# Patient Record
Sex: MALE | Race: WHITE | Employment: FULL TIME | URBAN - METROPOLITAN AREA
[De-identification: names, ages, dates, MRNs, and addresses within clinical notes are randomized per-mention and may not be internally consistent; named-entity substitution may affect disease eponyms.]

---

## 2018-12-18 ENCOUNTER — APPOINTMENT (OUTPATIENT)
Dept: GENERAL RADIOLOGY | Age: 48
End: 2018-12-18
Payer: COMMERCIAL

## 2018-12-18 ENCOUNTER — HOSPITAL ENCOUNTER (EMERGENCY)
Age: 48
Discharge: HOME | End: 2018-12-18
Attending: EMERGENCY MEDICINE
Payer: COMMERCIAL

## 2018-12-18 VITALS
OXYGEN SATURATION: 98 % | WEIGHT: 190 LBS | HEART RATE: 78 BPM | SYSTOLIC BLOOD PRESSURE: 167 MMHG | TEMPERATURE: 98 F | DIASTOLIC BLOOD PRESSURE: 89 MMHG | RESPIRATION RATE: 18 BRPM | BODY MASS INDEX: 30.67 KG/M2

## 2018-12-18 DIAGNOSIS — M25.571 ACUTE RIGHT ANKLE PAIN: Primary | ICD-10-CM

## 2018-12-18 PROCEDURE — 73610 X-RAY EXAM OF ANKLE: CPT

## 2018-12-18 PROCEDURE — 99283 EMERGENCY DEPT VISIT LOW MDM: CPT

## 2018-12-18 ASSESSMENT — PAIN DESCRIPTION - LOCATION: LOCATION: ANKLE

## 2018-12-18 ASSESSMENT — PAIN SCALES - GENERAL: PAINLEVEL_OUTOF10: 7

## 2018-12-18 NOTE — ED PROVIDER NOTES
to auscultation bilaterally  Cardiovascular: Regular rate and rhythm, 2+ radial pulses bilaterally  Abdominal: Nondistended abdomen, soft, nontender without rebound or gaurding  Skin: Warm, dry well perfused, no rashes  Extremities: no obvious deformities, no tenderness to palpation diffusely, ankle with mild tenderness palpation of the tibia till talar joint but no swelling or effusion. He is nervous inside the tube was DP pulse and intact sensation. His and painful dorsiflexion but full dorsiflexion and plantar flexion of the ankle. No lateral malleolar medial malleolar tenderness no knee pain or proximal fibular tenderness. Neurologic:  Alert and oriented, speech is clear and intact without dysarthria, gait is intact  Psychologic: appropriate mood and affect  Diagnostic Results     EKG       RADIOLOGY:  XR ANKLE RIGHT (MIN 3 VIEWS)   Final Result      Degenerative change      No acute bony abnormality of the right ankle      If concern for acute injury splinting with follow-up recommended          LABS:   No results found for this visit on 12/18/18. ED BEDSIDE ULTRASOUND:      RECENT VITALS:  BP: (!) 167/89,Temp: 98 °F (36.7 °C), Pulse: 78, Resp: 18, SpO2: 98 %     Procedures       ED Course     Nursing Notes, Past Medical Hx, Past Surgical Hx, Social Hx,Allergies, and Family Hx were reviewed. The patient was given the following medications:  No orders of the defined types were placed in this encounter. CONSULTS:  None    MEDICAL DECISIONMAKING / ASSESSMENT / PLAN     Alfred Bailey is a 46 y.o. male resents today with right anterior ankle pain. He endorses negative plantarflexion where he seems it jammed the talotibial joint locking on the steps. He has been able to put weight on it. Here x-rays are within normal limits.   He is denying wanting to use crutches began weightbearing as tolerated this time but we did endorse that if he continues to have pain should follow-up with an orthopedic

## 2021-05-27 ENCOUNTER — HOSPITAL ENCOUNTER (EMERGENCY)
Age: 51
Discharge: HOME | End: 2021-05-27
Attending: EMERGENCY MEDICINE
Payer: COMMERCIAL

## 2021-05-27 ENCOUNTER — APPOINTMENT (OUTPATIENT)
Dept: GENERAL RADIOLOGY | Age: 51
End: 2021-05-27
Payer: COMMERCIAL

## 2021-05-27 VITALS
SYSTOLIC BLOOD PRESSURE: 126 MMHG | WEIGHT: 220.68 LBS | TEMPERATURE: 98 F | OXYGEN SATURATION: 99 % | HEIGHT: 66 IN | RESPIRATION RATE: 10 BRPM | DIASTOLIC BLOOD PRESSURE: 89 MMHG | HEART RATE: 67 BPM | BODY MASS INDEX: 35.47 KG/M2

## 2021-05-27 DIAGNOSIS — R42 LIGHTHEADEDNESS: Primary | ICD-10-CM

## 2021-05-27 LAB
ANION GAP SERPL CALCULATED.3IONS-SCNC: 10 MMOL/L (ref 3–16)
BASOPHILS ABSOLUTE: 0.1 K/UL (ref 0–0.2)
BASOPHILS RELATIVE PERCENT: 0.7 %
BUN BLDV-MCNC: 16 MG/DL (ref 7–20)
CALCIUM SERPL-MCNC: 10 MG/DL (ref 8.3–10.6)
CHLORIDE BLD-SCNC: 101 MMOL/L (ref 99–110)
CO2: 26 MMOL/L (ref 21–32)
CREAT SERPL-MCNC: 1.2 MG/DL (ref 0.9–1.3)
EOSINOPHILS ABSOLUTE: 0.1 K/UL (ref 0–0.6)
EOSINOPHILS RELATIVE PERCENT: 1.3 %
GFR AFRICAN AMERICAN: >60
GFR NON-AFRICAN AMERICAN: >60
GLUCOSE BLD-MCNC: 111 MG/DL (ref 70–99)
HCT VFR BLD CALC: 46.9 % (ref 40.5–52.5)
HEMOGLOBIN: 16.2 G/DL (ref 13.5–17.5)
INR BLD: 3.68 (ref 0.86–1.14)
LYMPHOCYTES ABSOLUTE: 2.9 K/UL (ref 1–5.1)
LYMPHOCYTES RELATIVE PERCENT: 40.5 %
MCH RBC QN AUTO: 30 PG (ref 26–34)
MCHC RBC AUTO-ENTMCNC: 34.6 G/DL (ref 31–36)
MCV RBC AUTO: 86.7 FL (ref 80–100)
MONOCYTES ABSOLUTE: 0.9 K/UL (ref 0–1.3)
MONOCYTES RELATIVE PERCENT: 12.2 %
NEUTROPHILS ABSOLUTE: 3.2 K/UL (ref 1.7–7.7)
NEUTROPHILS RELATIVE PERCENT: 45.3 %
PDW BLD-RTO: 13.7 % (ref 12.4–15.4)
PLATELET # BLD: 204 K/UL (ref 135–450)
PMV BLD AUTO: 8.8 FL (ref 5–10.5)
POTASSIUM REFLEX MAGNESIUM: 3.6 MMOL/L (ref 3.5–5.1)
PRO-BNP: 40 PG/ML (ref 0–124)
PROTHROMBIN TIME: 43.3 SEC (ref 10–13.2)
RBC # BLD: 5.4 M/UL (ref 4.2–5.9)
SODIUM BLD-SCNC: 137 MMOL/L (ref 136–145)
TROPONIN: <0.01 NG/ML
TROPONIN: <0.01 NG/ML
WBC # BLD: 7.2 K/UL (ref 4–11)

## 2021-05-27 PROCEDURE — 85610 PROTHROMBIN TIME: CPT

## 2021-05-27 PROCEDURE — 83880 ASSAY OF NATRIURETIC PEPTIDE: CPT

## 2021-05-27 PROCEDURE — 93005 ELECTROCARDIOGRAM TRACING: CPT | Performed by: EMERGENCY MEDICINE

## 2021-05-27 PROCEDURE — 80048 BASIC METABOLIC PNL TOTAL CA: CPT

## 2021-05-27 PROCEDURE — 85025 COMPLETE CBC W/AUTO DIFF WBC: CPT

## 2021-05-27 PROCEDURE — 84484 ASSAY OF TROPONIN QUANT: CPT

## 2021-05-27 PROCEDURE — 71046 X-RAY EXAM CHEST 2 VIEWS: CPT

## 2021-05-27 PROCEDURE — 99285 EMERGENCY DEPT VISIT HI MDM: CPT

## 2021-05-27 ASSESSMENT — PAIN SCALES - GENERAL: PAINLEVEL_OUTOF10: 0

## 2021-05-27 ASSESSMENT — ENCOUNTER SYMPTOMS
COUGH: 0
RESPIRATORY NEGATIVE: 1
SHORTNESS OF BREATH: 0

## 2021-05-27 NOTE — ED PROVIDER NOTES
[address] [facility] [address] ENCOUNTER          PHYSICIAN ASSISTANT NOTE       Date of evaluation: 5/27/2021    Chief Complaint     Dizziness (Pt was at work this morning sitting at his desk and then felt onset of lightheaded, dizziness, SOB. )      History of Present Illness     Mercedez Jean is a 44 y.o. male who presents for dizziness. Patient reports he is seated at work when he suddenly felt lightheaded like he was going to pass out. No vertigo. Patient reports the symptoms have been waxing and waning since onset for seconds at a time. Patient denies any chest pain. Patient reports he may be felt a little short of breath, but reports it is possibly because he was worried about what was going on. Patient reports he normally only has coffee in the morning and that is what he had today, no change. Patient denies any recent illness. No leg pain or swelling. Patient takes warfarin due to history of aortic valve replacement in 2017. Patient has been compliant with his medication and has his levels checked regularly. Patient reports he has not had an echocardiogram \"in a while. \"    Review of Systems     Review of Systems   Constitutional: Negative. Negative for fever. Respiratory: Negative. Negative for cough and shortness of breath. Cardiovascular: Negative. Negative for chest pain and leg swelling. Musculoskeletal: Negative. Skin: Negative. Neurological: Positive for light-headedness. Negative for syncope, facial asymmetry, speech difficulty, weakness and headaches. Psychiatric/Behavioral: Negative. All other systems reviewed and are negative. Past Medical, Surgical, Family, and Social History     He has a past medical history of Hypertension, Murmur, and Other activity(E029.9). He has a past surgical history that includes Aortic valve replacement and Cholecystectomy. His family history is not on file. He reports that he has never smoked.  He does not have any smokeless tobacco history on file. He reports current alcohol use. He reports that he does not use drugs. Medications     Discharge Medication List as of 5/27/2021  2:40 PM      CONTINUE these medications which have NOT CHANGED    Details   metoprolol (LOPRESSOR) 25 MG tablet Take 25 mg by mouth 2 times daily      IBUPROFEN Take 600 mg by mouth as needed. Allergies     He has No Known Allergies. Physical Exam     INITIAL VITALS: BP: (!) 140/101, Temp: 98 °F (36.7 °C), Pulse: 70, Resp: 18, SpO2: 100 %  Physical Exam  Vitals and nursing note reviewed. Constitutional:       Appearance: He is not toxic-appearing. HENT:      Head: Normocephalic and atraumatic. Eyes:      Extraocular Movements: Extraocular movements intact. Conjunctiva/sclera: Conjunctivae normal.      Pupils: Pupils are equal, round, and reactive to light. Cardiovascular:      Rate and Rhythm: Normal rate and regular rhythm. Pulmonary:      Effort: Pulmonary effort is normal.      Breath sounds: Normal breath sounds. Musculoskeletal:      Cervical back: Normal range of motion and neck supple. Skin:     General: Skin is warm and dry. Neurological:      General: No focal deficit present. Mental Status: He is alert and oriented to person, place, and time. Mental status is at baseline. Psychiatric:         Mood and Affect: Mood normal.         Behavior: Behavior normal.         Thought Content: Thought content normal.         Judgment: Judgment normal.         Diagnostic Results     EKG   Interpreted in conjunction with emergency department physician No att. providers found  Rhythm: normal sinus   Rate: normal  Axis: normal  Ectopy: none  Conduction: normal  ST Segments: no acute change  T Waves: non specific changes  Q Waves:nonspecific  Clinical Impression: non-specific EKG      RADIOLOGY:  XR CHEST (2 VW)   Final Result   1. No evidence of acute cardiopulmonary disease.            LABS:   Results for orders placed or performed during the hospital encounter of 05/27/21   CBC Auto Differential   Result Value Ref Range    WBC 7.2 4.0 - 11.0 K/uL    RBC 5.40 4.20 - 5.90 M/uL    Hemoglobin 16.2 13.5 - 17.5 g/dL    Hematocrit 46.9 40.5 - 52.5 %    MCV 86.7 80.0 - 100.0 fL    MCH 30.0 26.0 - 34.0 pg    MCHC 34.6 31.0 - 36.0 g/dL    RDW 13.7 12.4 - 15.4 %    Platelets 343 749 - 277 K/uL    MPV 8.8 5.0 - 10.5 fL    Neutrophils % 45.3 %    Lymphocytes % 40.5 %    Monocytes % 12.2 %    Eosinophils % 1.3 %    Basophils % 0.7 %    Neutrophils Absolute 3.2 1.7 - 7.7 K/uL    Lymphocytes Absolute 2.9 1.0 - 5.1 K/uL    Monocytes Absolute 0.9 0.0 - 1.3 K/uL    Eosinophils Absolute 0.1 0.0 - 0.6 K/uL    Basophils Absolute 0.1 0.0 - 0.2 K/uL   Basic Metabolic Panel w/ Reflex to MG   Result Value Ref Range    Sodium 137 136 - 145 mmol/L    Potassium reflex Magnesium 3.6 3.5 - 5.1 mmol/L    Chloride 101 99 - 110 mmol/L    CO2 26 21 - 32 mmol/L    Anion Gap 10 3 - 16    Glucose 111 (H) 70 - 99 mg/dL    BUN 16 7 - 20 mg/dL    CREATININE 1.2 0.9 - 1.3 mg/dL    GFR Non-African American >60 >60    GFR African American >60 >60    Calcium 10.0 8.3 - 10.6 mg/dL   Brain Natriuretic Peptide   Result Value Ref Range    Pro-BNP 40 0 - 124 pg/mL   Troponin   Result Value Ref Range    Troponin <0.01 <0.01 ng/mL   Protime-INR   Result Value Ref Range    Protime 43.3 (H) 10.0 - 13.2 sec    INR 3.68 (H) 0.86 - 1.14   Troponin   Result Value Ref Range    Troponin <0.01 <0.01 ng/mL       ED BEDSIDE ULTRASOUND:      RECENT VITALS:  BP: 126/89, Temp: 98 °F (36.7 °C), Pulse: 67, Resp: 10, SpO2: 99 %     Procedures         ED Course     Nursing Notes, Past Medical Hx,Past Surgical Hx, Social Hx, Allergies, and Family Hx were reviewed. The patient was given the following medications:  No orders of the defined types were placed in this encounter.       CONSULTS:  None    MEDICAL DECISION MAKING / ASSESSMENT / PLAN      Andree is a 44 y.o. male with lightheadedness. Patient is well appearing and in no acute distress. EKG is normal sinus rhythm without signs of ischemia. Chest x-ray clear. Normal blood counts, electrolytes, renal function. proBNP is 40. Patient with 2 negative troponins. INR is 3.68. Patient has been hemodynamically stable throughout his ER stay. Patient does have occasional singular PVCs with no associated symptoms. Patient has not signs of ACS, CHF, PE, dissection on today's work-up. No signs of stroke. No vertiginous symptoms. Patient has not had anything besides coffee today, encouraged to hydrate. No signs of electrolyte imbalance or MAC. Patient will be discharged to follow-up with his cardiologist. Return precautions discussed. This patient was also evaluated by the attending physician. All care plans were discussed and agreed upon. Clinical Impression     1.  Lightheadedness        Disposition     PATIENT REFERRED TO:  [facility]  [address]  [place] [address]  [phone]    Schedule an appointment as soon as possible for a visit       [facility] Emergency Department  [address] [place] [address]  [phone]    As needed, If symptoms worsen      DISCHARGE MEDICATIONS:  Discharge Medication List as of 5/27/2021  2:40 PM          DISPOSITION Decision To Discharge 05/27/2021 02:00:49 PM        Simi Olivares PA-C  05/27/21 5103

## 2021-05-27 NOTE — ED PROVIDER NOTES
ED Attending Attestation Note     Date of evaluation: 5/27/2021    This patient was seen by the advance practice provider. I have seen and examined the patient, agree with the workup, evaluation, management and diagnosis. The care plan has been discussed. I have reviewed the ECG and concur with the JOSE's interpretation. My assessment reveals adult male generally well in appearance he presents with some episodic lightheadedness today, reporting it is not a feeling of vertigo or room spinning or really unsteadiness. He was observed on the monitor without significant ectopy. His vital signs are stable. His lab work-up is unremarkable. His EKG shows sinus rhythm without arrhythmia. He has a normal neurological exam including normal gait.        Jason Varela MD  05/27/21 6277

## 2021-05-27 NOTE — ED NOTES
Pt placed on CMU, pulse ox, and NIBP. Pt given call light and instructed on its use. No new needs at this time. Will cont to monitor.       Misa Mckenzie RN  05/27/21 5791

## 2021-05-27 NOTE — ED NOTES
EKG done on arrival and given to [facility] JOSY     [address]  05/27/21 0297

## 2021-06-03 LAB
EKG ATRIAL RATE: 72 BPM
EKG DIAGNOSIS: NORMAL
EKG P AXIS: 25 DEGREES
EKG P-R INTERVAL: 138 MS
EKG Q-T INTERVAL: 380 MS
EKG QRS DURATION: 96 MS
EKG QTC CALCULATION (BAZETT): 416 MS
EKG R AXIS: 40 DEGREES
EKG T AXIS: 60 DEGREES
EKG VENTRICULAR RATE: 72 BPM

## 2023-01-07 ENCOUNTER — HOSPITAL ENCOUNTER (INPATIENT)
Age: 53
LOS: 2 days | Discharge: HOME | DRG: 310 | End: 2023-01-09
Attending: EMERGENCY MEDICINE | Admitting: INTERNAL MEDICINE
Payer: COMMERCIAL

## 2023-01-07 ENCOUNTER — APPOINTMENT (OUTPATIENT)
Dept: GENERAL RADIOLOGY | Age: 53
DRG: 310 | End: 2023-01-07
Payer: COMMERCIAL

## 2023-01-07 DIAGNOSIS — I48.91 ATRIAL FIBRILLATION WITH RVR (HCC): Primary | ICD-10-CM

## 2023-01-07 DIAGNOSIS — R79.1 SUBTHERAPEUTIC INTERNATIONAL NORMALIZED RATIO (INR): ICD-10-CM

## 2023-01-07 LAB
A/G RATIO: 1.6 (ref 1.1–2.2)
ALBUMIN SERPL-MCNC: 4.6 G/DL (ref 3.4–5)
ALP BLD-CCNC: 52 U/L (ref 40–129)
ALT SERPL-CCNC: 28 U/L (ref 10–40)
ANION GAP SERPL CALCULATED.3IONS-SCNC: 11 MMOL/L (ref 3–16)
AST SERPL-CCNC: 24 U/L (ref 15–37)
BASOPHILS ABSOLUTE: 0.1 K/UL (ref 0–0.2)
BASOPHILS RELATIVE PERCENT: 0.8 %
BILIRUB SERPL-MCNC: 1.1 MG/DL (ref 0–1)
BUN BLDV-MCNC: 15 MG/DL (ref 7–20)
CALCIUM SERPL-MCNC: 10.4 MG/DL (ref 8.3–10.6)
CHLORIDE BLD-SCNC: 102 MMOL/L (ref 99–110)
CO2: 25 MMOL/L (ref 21–32)
CREAT SERPL-MCNC: 1 MG/DL (ref 0.9–1.3)
EOSINOPHILS ABSOLUTE: 0.1 K/UL (ref 0–0.6)
EOSINOPHILS RELATIVE PERCENT: 1.9 %
GFR SERPL CREATININE-BSD FRML MDRD: >60 ML/MIN/{1.73_M2}
GLUCOSE BLD-MCNC: 110 MG/DL (ref 70–99)
HCT VFR BLD CALC: 48.5 % (ref 40.5–52.5)
HEMOGLOBIN: 16.3 G/DL (ref 13.5–17.5)
INR BLD: 1.88 (ref 0.87–1.14)
LYMPHOCYTES ABSOLUTE: 2.3 K/UL (ref 1–5.1)
LYMPHOCYTES RELATIVE PERCENT: 31.4 %
MAGNESIUM: 2 MG/DL (ref 1.8–2.4)
MCH RBC QN AUTO: 29.3 PG (ref 26–34)
MCHC RBC AUTO-ENTMCNC: 33.6 G/DL (ref 31–36)
MCV RBC AUTO: 87.3 FL (ref 80–100)
MONOCYTES ABSOLUTE: 0.9 K/UL (ref 0–1.3)
MONOCYTES RELATIVE PERCENT: 12.3 %
NEUTROPHILS ABSOLUTE: 4 K/UL (ref 1.7–7.7)
NEUTROPHILS RELATIVE PERCENT: 53.6 %
PDW BLD-RTO: 14.7 % (ref 12.4–15.4)
PLATELET # BLD: 196 K/UL (ref 135–450)
PMV BLD AUTO: 8.7 FL (ref 5–10.5)
POTASSIUM REFLEX MAGNESIUM: 4.6 MMOL/L (ref 3.5–5.1)
PROTHROMBIN TIME: 21.6 SEC (ref 11.7–14.5)
RBC # BLD: 5.55 M/UL (ref 4.2–5.9)
SODIUM BLD-SCNC: 138 MMOL/L (ref 136–145)
TOTAL PROTEIN: 7.4 G/DL (ref 6.4–8.2)
TROPONIN: <0.01 NG/ML
TSH REFLEX: 2.99 UIU/ML (ref 0.27–4.2)
WBC # BLD: 7.5 K/UL (ref 4–11)

## 2023-01-07 PROCEDURE — 83735 ASSAY OF MAGNESIUM: CPT

## 2023-01-07 PROCEDURE — 84484 ASSAY OF TROPONIN QUANT: CPT

## 2023-01-07 PROCEDURE — 71045 X-RAY EXAM CHEST 1 VIEW: CPT

## 2023-01-07 PROCEDURE — 84443 ASSAY THYROID STIM HORMONE: CPT

## 2023-01-07 PROCEDURE — 85610 PROTHROMBIN TIME: CPT

## 2023-01-07 PROCEDURE — 96365 THER/PROPH/DIAG IV INF INIT: CPT

## 2023-01-07 PROCEDURE — 2060000000 HC ICU INTERMEDIATE R&B

## 2023-01-07 PROCEDURE — 2500000003 HC RX 250 WO HCPCS: Performed by: INTERNAL MEDICINE

## 2023-01-07 PROCEDURE — 80053 COMPREHEN METABOLIC PANEL: CPT

## 2023-01-07 PROCEDURE — 36415 COLL VENOUS BLD VENIPUNCTURE: CPT

## 2023-01-07 PROCEDURE — 1200000000 HC SEMI PRIVATE

## 2023-01-07 PROCEDURE — 2500000003 HC RX 250 WO HCPCS: Performed by: PHYSICIAN ASSISTANT

## 2023-01-07 PROCEDURE — 6370000000 HC RX 637 (ALT 250 FOR IP): Performed by: INTERNAL MEDICINE

## 2023-01-07 PROCEDURE — 2580000003 HC RX 258: Performed by: PHYSICIAN ASSISTANT

## 2023-01-07 PROCEDURE — 85025 COMPLETE CBC W/AUTO DIFF WBC: CPT

## 2023-01-07 PROCEDURE — 2580000003 HC RX 258: Performed by: INTERNAL MEDICINE

## 2023-01-07 PROCEDURE — 93005 ELECTROCARDIOGRAM TRACING: CPT | Performed by: EMERGENCY MEDICINE

## 2023-01-07 PROCEDURE — 6360000002 HC RX W HCPCS: Performed by: NURSE PRACTITIONER

## 2023-01-07 PROCEDURE — 99285 EMERGENCY DEPT VISIT HI MDM: CPT

## 2023-01-07 RX ORDER — SODIUM CHLORIDE 9 MG/ML
INJECTION, SOLUTION INTRAVENOUS PRN
Status: DISCONTINUED | OUTPATIENT
Start: 2023-01-07 | End: 2023-01-09 | Stop reason: HOSPADM

## 2023-01-07 RX ORDER — DILTIAZEM HYDROCHLORIDE 5 MG/ML
15 INJECTION INTRAVENOUS ONCE
Status: COMPLETED | OUTPATIENT
Start: 2023-01-07 | End: 2023-01-07

## 2023-01-07 RX ORDER — ACETAMINOPHEN 325 MG/1
650 TABLET ORAL EVERY 6 HOURS PRN
Status: DISCONTINUED | OUTPATIENT
Start: 2023-01-07 | End: 2023-01-09 | Stop reason: HOSPADM

## 2023-01-07 RX ORDER — SODIUM CHLORIDE 0.9 % (FLUSH) 0.9 %
5-40 SYRINGE (ML) INJECTION EVERY 12 HOURS SCHEDULED
Status: DISCONTINUED | OUTPATIENT
Start: 2023-01-07 | End: 2023-01-09 | Stop reason: HOSPADM

## 2023-01-07 RX ORDER — POLYETHYLENE GLYCOL 3350 17 G/17G
17 POWDER, FOR SOLUTION ORAL DAILY PRN
Status: DISCONTINUED | OUTPATIENT
Start: 2023-01-07 | End: 2023-01-09 | Stop reason: HOSPADM

## 2023-01-07 RX ORDER — PROCHLORPERAZINE EDISYLATE 5 MG/ML
10 INJECTION INTRAMUSCULAR; INTRAVENOUS EVERY 6 HOURS PRN
Status: DISCONTINUED | OUTPATIENT
Start: 2023-01-07 | End: 2023-01-09 | Stop reason: HOSPADM

## 2023-01-07 RX ORDER — ATORVASTATIN CALCIUM 10 MG/1
10 TABLET, FILM COATED ORAL NIGHTLY
Status: DISCONTINUED | OUTPATIENT
Start: 2023-01-07 | End: 2023-01-09 | Stop reason: HOSPADM

## 2023-01-07 RX ORDER — ACETAMINOPHEN 650 MG/1
650 SUPPOSITORY RECTAL EVERY 6 HOURS PRN
Status: DISCONTINUED | OUTPATIENT
Start: 2023-01-07 | End: 2023-01-09 | Stop reason: HOSPADM

## 2023-01-07 RX ORDER — WARFARIN SODIUM 3 MG/1
6 TABLET ORAL DAILY
COMMUNITY

## 2023-01-07 RX ORDER — ATORVASTATIN CALCIUM 10 MG/1
10 TABLET, FILM COATED ORAL DAILY
COMMUNITY

## 2023-01-07 RX ORDER — SODIUM CHLORIDE 0.9 % (FLUSH) 0.9 %
5-40 SYRINGE (ML) INJECTION PRN
Status: DISCONTINUED | OUTPATIENT
Start: 2023-01-07 | End: 2023-01-09 | Stop reason: HOSPADM

## 2023-01-07 RX ORDER — DIGOXIN 0.25 MG/ML
250 INJECTION INTRAMUSCULAR; INTRAVENOUS ONCE
Status: COMPLETED | OUTPATIENT
Start: 2023-01-07 | End: 2023-01-07

## 2023-01-07 RX ADMIN — ACETAMINOPHEN 650 MG: 325 TABLET ORAL at 20:21

## 2023-01-07 RX ADMIN — WARFARIN SODIUM 8 MG: 5 TABLET ORAL at 19:41

## 2023-01-07 RX ADMIN — Medication 10 ML: at 19:42

## 2023-01-07 RX ADMIN — ATORVASTATIN CALCIUM 10 MG: 10 TABLET, FILM COATED ORAL at 19:42

## 2023-01-07 RX ADMIN — DILTIAZEM HYDROCHLORIDE 15 MG: 5 INJECTION INTRAVENOUS at 11:40

## 2023-01-07 RX ADMIN — DILTIAZEM HYDROCHLORIDE 5 MG/HR: 5 INJECTION, SOLUTION INTRAVENOUS at 11:45

## 2023-01-07 RX ADMIN — DILTIAZEM HYDROCHLORIDE 15 MG/HR: 5 INJECTION, SOLUTION INTRAVENOUS at 20:19

## 2023-01-07 RX ADMIN — DIGOXIN 250 MCG: 0.25 INJECTION INTRAMUSCULAR; INTRAVENOUS at 21:17

## 2023-01-07 ASSESSMENT — PAIN SCALES - GENERAL
PAINLEVEL_OUTOF10: 0
PAINLEVEL_OUTOF10: 3

## 2023-01-07 ASSESSMENT — PAIN - FUNCTIONAL ASSESSMENT: PAIN_FUNCTIONAL_ASSESSMENT: NONE - DENIES PAIN

## 2023-01-07 ASSESSMENT — PAIN DESCRIPTION - DESCRIPTORS: DESCRIPTORS: ACHING

## 2023-01-07 ASSESSMENT — PAIN DESCRIPTION - LOCATION: LOCATION: HEAD

## 2023-01-07 NOTE — ED NOTES
C4 stated pt has not been assigned to a nurse yet, will call back.       Damir Ayoub RN  01/07/23 6539
[email or web address]
Pt provided with ice chips, states no other needs at this time.      Zahida Hampton RN  01/07/23 4634    
Pt provided with sandwich and drink. Pt states no other needs at this time.       Juan Hawkins, PHILIP  01/07/23 2227
Pt provided with urinal and warm blanket, pt states no other needs at this time.       Matt Can RN  01/07/23 9454
06-Sep-2022 16:56

## 2023-01-07 NOTE — H&P
Hospital Medicine History & Physical      PCP: Charito Paz    Date of Admission: 1/7/2023    Date of Service: Pt seen/examined on 01/07/23  and Admitted to Inpatient with expected LOS greater than two midnights due to medical therapy of atrial fibrillation with RVR    Chief Complaint:  Palpitations      History Of Present Illness:       45 y.o. male who presented to [facility] with episode of palpitations. Active patient who was working out. While he was using the elliptical he felt some palpitations. Felt his pulse which was very fast and irregular. Decided to come to the emergency department as he had a similar episode in the past and was diagnosed with atrial fibrillation. No chest pain, no shortness of breath. States he only had 1 episode like this about 6 years ago. At that time he developed acute atrial fibrillation with rapid ventricular response. Was diagnosed with bike use. Aortic valve with accompanying aortic stenosis. Had valve replacement. During that episode, atrial fibrillation resolved and has not recurred over the past 6 years. This is the second episode ever. No other accompanying symptoms  Nothing else that makes the patient feel better or worse. Past Medical History:          Diagnosis Date    Atrial fibrillation ([address]     Hypertension     Murmur     Other activity(E029.9)     nose bleeds for last couple years, increase frequency over last few months random        Past Surgical History:          Procedure Laterality Date    AORTIC VALVE REPLACEMENT      CHOLECYSTECTOMY         Medications Prior to Admission:      Prior to Admission medications    Medication Sig Start Date End Date Taking? Authorizing Provider   warfarin (COUMADIN) 3 MG tablet Take 6 mg by mouth daily   Yes Historical Provider, MD   atorvastatin (LIPITOR) 10 MG tablet Take 10 mg by mouth daily   Yes Historical Provider, MD   IBUPROFEN Take 600 mg by mouth as needed.       Historical Provider, MD Allergies:  Patient has no known allergies. Social History:      The patient currently lives at home    TOBACCO:   reports that he has never smoked. He does not have any smokeless tobacco history on file. ETOH:   reports current alcohol use. E-cigarette/Vaping       Questions Responses    E-cigarette/Vaping Use     Start Date     Passive Exposure     Quit Date     Counseling Given     Comments               Family History:      Reviewed and negative in regards to presenting illness/complaint. History reviewed. No pertinent family history. REVIEW OF SYSTEMS COMPLETED:   Pertinent positives as noted in the HPI. Palpitations. All other systems reviewed and negative. PHYSICAL EXAM PERFORMED:    /84   Pulse (!) 106   Temp 98.6 °F (37 °C) (Oral)   Resp 14   Ht 5' 6\" (1.676 m)   Wt 191 lb (86.6 kg)   SpO2 99%   BMI 30.83 kg/m²     General appearance:  No apparent distress, appears stated age and cooperative. HEENT:  Normal cephalic, atraumatic without obvious deformity. Pupils equal, round, and reactive to light. Extra ocular muscles intact. Conjunctivae/corneas clear. Neck: Supple, with full range of motion. No jugular venous distention. Trachea midline. Respiratory:  Normal respiratory effort. Clear to auscultation, bilaterally without Rales/Wheezes/Rhonchi. Cardiovascular:  Irregularly irregular rhythm with normal S1/S2 without murmurs, rubs or gallops. Abdomen: Soft, non-tender, non-distended with normal bowel sounds. Musculoskeletal:  No clubbing, cyanosis or edema bilaterally. Full range of motion without deformity. Skin: Skin color, texture, turgor normal.  No rashes or lesions. Neurologic:  Neurovascularly intact without any focal sensory/motor deficits.  Cranial nerves: II-XII intact, grossly non-focal.  Psychiatric:  Alert and oriented, thought content appropriate, normal insight  Capillary Refill: Brisk,3 seconds, normal  Peripheral Pulses: +2 palpable, equal bilaterally       Labs:     Recent Labs     01/07/23  1131   WBC 7.5   HGB 16.3   HCT 48.5        Recent Labs     01/07/23  1131      K 4.6      CO2 25   BUN 15   CREATININE 1.0   CALCIUM 10.4     Recent Labs     01/07/23  1131   AST 24   ALT 28   BILITOT 1.1*   ALKPHOS 52     Recent Labs     01/07/23  1131   INR 1.88*     Recent Labs     01/07/23  1131   TROPONINI <0.01       Urinalysis:    No results found for: [place], BACTERIA, RBCUA, BLOODU, [address], [address]    Radiology:     CXR: I have reviewed the CXR with the following interpretation: No acute changes  EKG:  I have reviewed the EKG with the following interpretation: Atrial fibrillation with RVR    XR CHEST PORTABLE   Final Result   No acute process. Consults:    IP CONSULT TO HOSPITALIST  PHARMACY TO DOSE WARFARIN  IP CONSULT TO CARDIOLOGY    ASSESSMENT:    Active Hospital Problems    Diagnosis Date Noted    Atrial fibrillation with rapid ventricular response ([facility] [address] [I48.91] 01/07/2023     Priority: Medium         PLAN:    Atrial fibrillation with rapid ventricular response  Second episode. First episode was 6 years ago when aortic valve stenosis was diagnosed. Started on diltiazem drip  Already on warfarin for anticoagulation. This will be continued. Patient does not have chest pain shortness of breath or any other signs of compromise in cardiac function. Patient's own cardiologist is at [facility].  We will consult a cardiologist at our hospital.  Given acute and unexpected onset, I will also check TSH to establish thyroid function. Mechanical cardiac valve in situ  Well-functioning. On warfarin. We will obtain an echo for better evaluation of location and function    Dyslipidemia  Continue statin at same dose and recheck lipid panel. No evidence of rhabdomyolysis    Discussed with the patient.   Questions answered      DVT Prophylaxis: Therapeutic anticoagulation  Diet: Regular diet  Code Status: Full code    PT/OT Eval Status: Not needed    Dispo -inpatient stay pending cardiology recommendations       Kassandra Myers MD    Thank you Yoseph Beasley for the opportunity to be involved in this patient's care. If you have any questions or concerns please feel free to contact me at [phone].

## 2023-01-07 NOTE — ED PROVIDER NOTES
[address] [facility]  ED  EMERGENCY DEPARTMENT ENCOUNTER        Pt Name: Dileep Chávez  MRN: [id number]  [place] 1970  Date of evaluation: 1/7/2023  Provider: Biju Wilson PA-C  PCP: Oswaldo WANG  Note Started: 11:21 AM EST 1/7/23       I have seen and evaluated this patient with my supervising physician Yamila Davila MD.    I personally saw the patient and independently provided 34 minutes of non-concurrent critical care time out of the total critical care time provided. This excludes time spent doing separately billable procedures. This includes time at the bedside, data interpretation, medication management, obtaining critical history from collateral sources if the patient is unable to provide it directly, and physician consultation. Specifics of interventions taken and potentially life-threatening diagnostic considerations are listed above in the medical decision making. CHIEF COMPLAINT       Chief Complaint   Patient presents with    Palpitations     Started while exercising 1 hour PTA. Hx of A-fib. HISTORY OF PRESENT ILLNESS: 1 or more Elements     History From: patient  Limitations to history : None    Dileep Chávez is a 45 y.o. male who presents to the emergency department with a chief complaint of palpitations that began while he was working out using the [employer].  Has history of atrial fibrillation with mechanical bicuspid valve anticoagulated on Coumadin on Norvasc and chlorthalidone at home. Has not had issues since he initially went into atrial fibrillation 6 years ago at [facility].  Never required admission to the hospital besides the first time. Denies chest pain, shortness of breath, lightheadedness, cough, fevers, leg swelling or any other symptoms. Nursing Notes were all reviewed and agreed with or any disagreements were addressed in the HPI. REVIEW OF SYSTEMS :      Review of Systems    Positives and Pertinent negatives as per HPI. SURGICAL HISTORY     Past Surgical History:   Procedure Laterality Date    AORTIC VALVE REPLACEMENT      CHOLECYSTECTOMY         CURRENTMEDICATIONS       Previous Medications    ATORVASTATIN (LIPITOR) 10 MG TABLET    Take 10 mg by mouth daily    IBUPROFEN    Take 600 mg by mouth as needed. WARFARIN (COUMADIN) 3 MG TABLET    Take 6 mg by mouth daily       ALLERGIES     Patient has no known allergies. FAMILYHISTORY     History reviewed. No pertinent family history. SOCIAL HISTORY       Social History     Tobacco Use    Smoking status: Never   Substance Use Topics    Alcohol use: Yes     Comment: social     Drug use: No       SCREENINGS        [place] Coma Scale  Eye Opening: Spontaneous  Best Verbal Response: Oriented  Best Motor Response: Obeys commands  [place] Coma Scale Score: 15                CIWA Assessment  BP: (!) 107/59  Heart Rate: 93           PHYSICAL EXAM  1 or more Elements     ED Triage Vitals [01/07/23 1111]   BP Temp Temp Source Heart Rate Resp SpO2 Height Weight   (!) 142/85 98.6 °F (37 °C) Oral (!) 162 14 97 % 5' 6\" (1.676 m) 191 lb (86.6 kg)       Physical Exam  Vitals and nursing note reviewed. Constitutional:       Appearance: He is well-developed. He is not diaphoretic. HENT:      Head: Atraumatic. Nose: Nose normal.   Eyes:      General:         Right eye: No discharge. Left eye: No discharge. Cardiovascular:      Rate and Rhythm: Tachycardia present. Rhythm irregular. Heart sounds: No murmur heard. No friction rub. No gallop. Comments: Tachycardic irregularly irregular rate and rhythm  Pulmonary:      Effort: Pulmonary effort is normal. No respiratory distress. Breath sounds: No stridor. No wheezing, rhonchi or rales. Abdominal:      General: Bowel sounds are normal. There is no distension. Palpations: Abdomen is soft. There is no mass. Tenderness: There is no abdominal tenderness. There is no guarding or rebound.       Hernia: No hernia is present. Musculoskeletal:         General: No swelling. Normal range of motion. Cervical back: Normal range of motion. Right lower leg: No edema. Left lower leg: No edema. Skin:     General: Skin is warm and dry. Findings: No erythema or rash. Neurological:      Mental Status: He is alert and oriented to person, place, and time. Cranial Nerves: No cranial nerve deficit. Psychiatric:         Behavior: Behavior normal.           DIAGNOSTIC RESULTS   LABS:    Labs Reviewed   COMPREHENSIVE METABOLIC PANEL W/ REFLEX TO MG FOR LOW K - Abnormal; Notable for the following components:       Result Value    Glucose 110 (*)     Total Bilirubin 1.1 (*)     All other components within normal limits   PROTIME-INR - Abnormal; Notable for the following components:    Protime 21.6 (*)     INR 1.88 (*)     All other components within normal limits   CBC WITH AUTO DIFFERENTIAL   TROPONIN   MAGNESIUM   TSH WITH REFLEX   TSH WITH REFLEX   TROPONIN   TROPONIN       When ordered only abnormal lab results are displayed. All other labs were within normal range or not returned as of this dictation. EKG: When ordered, EKG's are interpreted by the Emergency Department Physician in the absence of a cardiologist.  Please see their note for interpretation of EKG. RADIOLOGY:   Non-plain film images such as CT, Ultrasound and MRI are read by the radiologist. Plain radiographic images are visualized and preliminarily interpreted by the ED Provider with the below findings:        Interpretation per the Radiologist below, if available at the time of this note:    XR CHEST PORTABLE   Final Result   No acute process. No results found. No results found. PROCEDURES   Unless otherwise noted below, none     Procedures    CRITICAL CARE TIME (.cctime)       PAST MEDICAL HISTORY      has a past medical history of Atrial fibrillation ([address] Hypertension, Murmur, and Other activity(E029.9). EMERGENCY DEPARTMENT COURSE and DIFFERENTIAL DIAGNOSIS/MDM:   Vitals:    Vitals:    01/07/23 1243 01/07/23 1320 01/07/23 1347 01/07/23 1505   BP: 132/70 123/77 115/69 (!) 107/59   Pulse: 91 (!) 127 (!) 121 93   Resp: 14 14 14 14   Temp:       TempSrc:       SpO2: 98% 96% 98% 99%   Weight:       Height:           Patient was given the following medications:  Medications   dilTIAZem injection 15 mg (15 mg IntraVENous Given 1/7/23 1140)     Followed by   dilTIAZem 125 mg in dextrose 5 % 125 mL infusion (15 mg/hr IntraVENous Rate/Dose Change 1/7/23 1507)   atorvastatin (LIPITOR) tablet 10 mg (has no administration in time range)   warfarin (COUMADIN) tablet 6 mg (has no administration in time range)   sodium chloride flush 0.9 % injection 5-40 mL (has no administration in time range)   sodium chloride flush 0.9 % injection 5-40 mL (has no administration in time range)   0.9 % sodium chloride infusion (has no administration in time range)   polyethylene glycol (GLYCOLAX) packet 17 g (has no administration in time range)   acetaminophen (TYLENOL) tablet 650 mg (has no administration in time range)     Or   acetaminophen (TYLENOL) suppository 650 mg (has no administration in time range)   perflutren lipid microspheres (DEFINITY) injection 1.5 mL (has no administration in time range)   prochlorperazine (COMPAZINE) injection 10 mg (has no administration in time range)             Is this patient to be included in the SEP-1 Core Measure due to severe sepsis or septic shock? No   Exclusion criteria - the patient is NOT to be included for SEP-1 Core Measure due to: Infection is not suspected    Chronic Conditions affecting care:    has a past medical history of Atrial fibrillation ([address] Hypertension, Murmur, and Other activity(E029.9).     CONSULTS: (Who and What was discussed)  Ernest Grandchild Dr. Ferol Landau -hospitalist    Social Determinants : None    Records Reviewed (Source):     CC/HPI Summary, DDx, ED Course, and Reassessment: Patient presented with palpitations.  Was found to be in atrial fibrillation with RVR.  After 15 mg IV bolus of Cardizem and on drip at 5 mg/h IV his heart rate is now down to the low 100s.  Despite Into the 120s when talking.  He does have a mildly subtherapeutic INR with history of mechanical bicuspid valve.  Remainder of work-up is unremarkable.  Low suspicion for sepsis, pulmonary embolus or other emergent etiology.  Discussed this with hospitalist for admission due to atrial fibrillation with RVR.  Do not believe any further work-up or testing is warranted this time.  Patient was stable time of admission.  Continues to deny any pain or shortness of breath.    Disposition Considerations (tests considered but not done, Admit vs D/C, Shared Decision Making, Pt Expectation of Test or Tx.):        I am the Primary Clinician of Record.  FINAL IMPRESSION      1. Atrial fibrillation with RVR (HCC)    2. Subtherapeutic international normalized ratio (INR)          DISPOSITION/PLAN     DISPOSITION Admitted 01/07/2023 12:40:42 PM      PATIENT REFERRED TO:  No follow-up provider specified.    DISCHARGE MEDICATIONS:  New Prescriptions    No medications on file       DISCONTINUED MEDICATIONS:  Discontinued Medications    METOPROLOL (LOPRESSOR) 25 MG TABLET    Take 25 mg by mouth 2 times daily              (Please note that portions of this note were completed with a voice recognition program.  Efforts were made to edit the dictations but occasionally words are mis-transcribed.)    Erwin Alba PA-C (electronically signed)        Erwin Alba PA-C  01/07/23 0433

## 2023-01-07 NOTE — ED PROVIDER NOTES
I independently performed a history and physical on Kal Bernal. I personally saw the patient and performed a substantive portion of the visit including all aspects of the medical decision making. All diagnostic, treatment, and disposition decisions were made by myself in conjunction with the advanced practice provider. I have participated in the medical decision making and directed the treatment plan and disposition of the patient. For further details of Rohini Quiroz emergency department encounter, please see the advanced practice provider's documentation. CHIEF COMPLAINT  Chief Complaint   Patient presents with    Palpitations     Started while exercising 1 hour PTA. Hx of A-fib. Briefly, Kal Bernal is a 45 y.o. male  who presents to the ED complaining of palpitations    FOCUSED PHYSICAL EXAMINATION  /68   Pulse (!) 114   Temp 98.6 °F (37 °C) (Oral)   Resp 14   Ht 5' 6\" (1.676 m)   Wt 191 lb (86.6 kg)   SpO2 99%   BMI 30.83 kg/m²      Focused physical examination:  General appearance:  Cooperative. No acute distress. Skin:  Warm. Dry. Eye:  Extraocular movements intact. Ears, nose, mouth and throat:  Oral mucosa moist,  Neck:  Trachea midline. Heart: Tachycardic and irregularly irregular with systolic ejection murmur appreciated  Perfusion:  intact  Respiratory:  Respirations nonlabored. Lungs clear to auscultation bilaterally. Abdominal:   Non distended. Nontender  Neurological:  Alert and oriented x 3. Moves all extremities spontaneously  Musculoskeletal:   Normal ROM, no deformities          Psychiatric:  Normal mood      EKG *Interpreted by me*: Atrial fibrillation with rapid ventricular response and occasional PVCs rate of 144 bpm.  Lateral ST segment changes. No ST elevation. A. fib is new when compared to prior from May 2021.     Differential Diagnosis: Atrial fibrillation, SVT, atrial flutter, PE    Patient presents emergency department today with palpitations. Remote history of A. fib found to be in the same today with a rapid ventricular response. Placed on a diltiazem drip and has responded well. Plan to admit to the hospital.    [address]    I personally saw the patient and independently provided 30 minutes of non-concurrent critical care out of the total shared critical care time provided, excluding separately reportable procedures. There was a high probability of clinically significant/life threatening deterioration in the patient's condition which required my urgent intervention. This time was spent reviewing the patient chart, interpreting diagnostic/laboratory data, initiation and maintenance of diltiazem drip      History From: Patient         Chronic Conditions: Noted in HPI    CONSULTS: (Who and What was discussed)  IP CONSULT TO HOSPITALIST            Disposition Considerations (Tests not ordered but considered, Shared Decision Making, Pt Expectation of Test or Tx.):     During the patient's ED course, the patient was given:  Medications   dilTIAZem injection 15 mg (15 mg IntraVENous Given 1/7/23 1140)     Followed by   dilTIAZem 125 mg in dextrose 5 % 125 mL infusion (7.5 mg/hr IntraVENous Rate/Dose Change 1/7/23 1213)        CLINICAL IMPRESSION  1. Atrial fibrillation with RVR ([address]    2. Subtherapeutic international normalized ratio (INR)        DISPOSITION  Admission      This chart was created using Dragon dictation software. Efforts were made by me to ensure accuracy, however some errors may be present due to limitations of this technology.             Isrrael Neil MD  01/07/23 7055

## 2023-01-07 NOTE — CONSULTS
Pharmacy Note  Warfarin Consult  Dx: AVR  Goal INR range 2.5-3.5  Home Warfarin dose: 6 mg daily    Date  INR  Warfarin   1/7                  1.88                     8 mg     Recommend Warfarin 8 mg tonight x1. Daily INR ordered. Rx will continue to manage therapy per consult order.     Darrell Robert, PharmD    1/7/2023 6:33 PM

## 2023-01-08 LAB
A/G RATIO: 1.4 (ref 1.1–2.2)
ALBUMIN SERPL-MCNC: 3.8 G/DL (ref 3.4–5)
ALP BLD-CCNC: 45 U/L (ref 40–129)
ALT SERPL-CCNC: 22 U/L (ref 10–40)
ANION GAP SERPL CALCULATED.3IONS-SCNC: 8 MMOL/L (ref 3–16)
AST SERPL-CCNC: 16 U/L (ref 15–37)
BILIRUB SERPL-MCNC: 0.6 MG/DL (ref 0–1)
BUN BLDV-MCNC: 15 MG/DL (ref 7–20)
CALCIUM SERPL-MCNC: 9.2 MG/DL (ref 8.3–10.6)
CHLORIDE BLD-SCNC: 106 MMOL/L (ref 99–110)
CO2: 25 MMOL/L (ref 21–32)
CREAT SERPL-MCNC: 1.1 MG/DL (ref 0.9–1.3)
EKG ATRIAL RATE: 115 BPM
EKG ATRIAL RATE: 288 BPM
EKG ATRIAL RATE: 65 BPM
EKG DIAGNOSIS: NORMAL
EKG P-R INTERVAL: 140 MS
EKG Q-T INTERVAL: 300 MS
EKG Q-T INTERVAL: 338 MS
EKG Q-T INTERVAL: 384 MS
EKG QRS DURATION: 78 MS
EKG QRS DURATION: 84 MS
EKG QRS DURATION: 88 MS
EKG QTC CALCULATION (BAZETT): 399 MS
EKG QTC CALCULATION (BAZETT): 408 MS
EKG QTC CALCULATION (BAZETT): 464 MS
EKG R AXIS: 170 DEGREES
EKG R AXIS: 23 DEGREES
EKG R AXIS: 36 DEGREES
EKG T AXIS: 120 DEGREES
EKG T AXIS: 205 DEGREES
EKG T AXIS: 232 DEGREES
EKG VENTRICULAR RATE: 144 BPM
EKG VENTRICULAR RATE: 65 BPM
EKG VENTRICULAR RATE: 88 BPM
GFR SERPL CREATININE-BSD FRML MDRD: >60 ML/MIN/{1.73_M2}
GLUCOSE BLD-MCNC: 123 MG/DL (ref 70–99)
INR BLD: 2.19 (ref 0.87–1.14)
POTASSIUM REFLEX MAGNESIUM: 4 MMOL/L (ref 3.5–5.1)
PRO-BNP: 889 PG/ML (ref 0–124)
PROTHROMBIN TIME: 24.4 SEC (ref 11.7–14.5)
SODIUM BLD-SCNC: 139 MMOL/L (ref 136–145)
TOTAL PROTEIN: 6.6 G/DL (ref 6.4–8.2)

## 2023-01-08 PROCEDURE — 2500000003 HC RX 250 WO HCPCS: Performed by: INTERNAL MEDICINE

## 2023-01-08 PROCEDURE — 84443 ASSAY THYROID STIM HORMONE: CPT

## 2023-01-08 PROCEDURE — 1200000000 HC SEMI PRIVATE

## 2023-01-08 PROCEDURE — 36415 COLL VENOUS BLD VENIPUNCTURE: CPT

## 2023-01-08 PROCEDURE — 85610 PROTHROMBIN TIME: CPT

## 2023-01-08 PROCEDURE — 93010 ELECTROCARDIOGRAM REPORT: CPT | Performed by: INTERNAL MEDICINE

## 2023-01-08 PROCEDURE — 99223 1ST HOSP IP/OBS HIGH 75: CPT | Performed by: INTERNAL MEDICINE

## 2023-01-08 PROCEDURE — 6360000002 HC RX W HCPCS: Performed by: INTERNAL MEDICINE

## 2023-01-08 PROCEDURE — 2580000003 HC RX 258: Performed by: INTERNAL MEDICINE

## 2023-01-08 PROCEDURE — 93005 ELECTROCARDIOGRAM TRACING: CPT | Performed by: INTERNAL MEDICINE

## 2023-01-08 PROCEDURE — 83880 ASSAY OF NATRIURETIC PEPTIDE: CPT

## 2023-01-08 PROCEDURE — 6370000000 HC RX 637 (ALT 250 FOR IP): Performed by: INTERNAL MEDICINE

## 2023-01-08 PROCEDURE — 80053 COMPREHEN METABOLIC PANEL: CPT

## 2023-01-08 RX ORDER — MAGNESIUM SULFATE 1 G/100ML
1000 INJECTION INTRAVENOUS ONCE
Status: COMPLETED | OUTPATIENT
Start: 2023-01-08 | End: 2023-01-08

## 2023-01-08 RX ORDER — DIGOXIN 0.25 MG/ML
250 INJECTION INTRAMUSCULAR; INTRAVENOUS ONCE
Status: COMPLETED | OUTPATIENT
Start: 2023-01-08 | End: 2023-01-08

## 2023-01-08 RX ORDER — FUROSEMIDE 10 MG/ML
20 INJECTION INTRAMUSCULAR; INTRAVENOUS ONCE
Status: COMPLETED | OUTPATIENT
Start: 2023-01-08 | End: 2023-01-08

## 2023-01-08 RX ADMIN — DILTIAZEM HYDROCHLORIDE 15 MG/HR: 5 INJECTION, SOLUTION INTRAVENOUS at 14:15

## 2023-01-08 RX ADMIN — ATORVASTATIN CALCIUM 10 MG: 10 TABLET, FILM COATED ORAL at 20:24

## 2023-01-08 RX ADMIN — WARFARIN SODIUM 6 MG: 5 TABLET ORAL at 18:05

## 2023-01-08 RX ADMIN — DIGOXIN 250 MCG: 0.25 INJECTION INTRAMUSCULAR; INTRAVENOUS at 12:54

## 2023-01-08 RX ADMIN — FUROSEMIDE 20 MG: 10 INJECTION, SOLUTION INTRAMUSCULAR; INTRAVENOUS at 12:53

## 2023-01-08 RX ADMIN — Medication 10 ML: at 20:24

## 2023-01-08 RX ADMIN — DILTIAZEM HYDROCHLORIDE 15 MG/HR: 5 INJECTION, SOLUTION INTRAVENOUS at 05:09

## 2023-01-08 RX ADMIN — MAGNESIUM SULFATE HEPTAHYDRATE 1000 MG: 1 INJECTION, SOLUTION INTRAVENOUS at 13:02

## 2023-01-08 ASSESSMENT — PAIN SCALES - GENERAL: PAINLEVEL_OUTOF10: 0

## 2023-01-08 NOTE — PLAN OF CARE
Problem: Discharge Planning  Goal: Discharge to home or other facility with appropriate resources  Outcome: Progressing     Problem: Neurosensory - Adult  Goal: Achieves stable or improved neurological status  Outcome: Progressing  Goal: Absence of seizures  Outcome: Progressing  Goal: Remains free of injury related to seizures activity  Outcome: Progressing  Goal: Achieves maximal functionality and self care  Outcome: Progressing     Problem: Respiratory - Adult  Goal: Achieves optimal ventilation and oxygenation  Outcome: Progressing     Problem: Cardiovascular - Adult  Goal: Maintains optimal cardiac output and hemodynamic stability  Outcome: Progressing  Goal: Absence of cardiac dysrhythmias or at baseline  Outcome: Progressing     Problem: Cardiovascular - Adult  Goal: Maintains optimal cardiac output and hemodynamic stability  Outcome: Progressing  Goal: Absence of cardiac dysrhythmias or at baseline  Outcome: Progressing     Problem: Skin/Tissue Integrity - Adult  Goal: Skin integrity remains intact  Outcome: Progressing  Goal: Incisions, wounds, or drain sites healing without S/S of infection  Outcome: Progressing  Goal: Oral mucous membranes remain intact  Outcome: Progressing     Problem: Musculoskeletal - Adult  Goal: Return mobility to safest level of function  Outcome: Progressing  Goal: Maintain proper alignment of affected body part  Outcome: Progressing  Goal: Return ADL status to a safe level of function  Outcome: Progressing     Problem: Gastrointestinal - Adult  Goal: Minimal or absence of nausea and vomiting  Outcome: Progressing  Goal: Maintains or returns to baseline bowel function  Outcome: Progressing  Goal: Maintains adequate nutritional intake  Outcome: Progressing  Goal: Establish and maintain optimal ostomy function  Outcome: Progressing     Problem: Genitourinary - Adult  Goal: Absence of urinary retention  Outcome: Progressing  Goal: Urinary catheter remains patent  Outcome: Progressing     Problem: Infection - Adult  Goal: Absence of infection at discharge  Outcome: Progressing  Goal: Absence of infection during hospitalization  Outcome: Progressing  Goal: Absence of fever/infection during anticipated neutropenic period  Outcome: Progressing     Problem: Metabolic/Fluid and Electrolytes - Adult  Goal: Electrolytes maintained within normal limits  Outcome: Progressing  Goal: Hemodynamic stability and optimal renal function maintained  Outcome: Progressing  Goal: Glucose maintained within prescribed range  Outcome: Progressing     Problem: Hematologic - Adult  Goal: Maintains hematologic stability  Outcome: Progressing

## 2023-01-08 NOTE — CONSULTS
1/7/23 @ 20:04 left msg for Cardiac consult for [facility]. Linda Talbot

## 2023-01-08 NOTE — CONSULTS
[facility]  [phone]      Chief Complaint   Patient presents with    Palpitations     Started while exercising 1 hour PTA. Hx of A-fib. History of Present Illness:  Jessica Varghese is a 45 y.o. patient who presented to the hospital with complaints of atrial fibrillation. History provided by pt and wife. I have been asked to provide consultation regarding further management and testing. He reports that he has been feeling good. He exercises regularly and intentionally lost 20 + lbs. He states that he takes warfarin at home at had an Inr of 1.5 last week. He states that he had his ears cleaned out at urgent care recently and took a single abx that he does not know the name of. He reports that he had 2-3 drinks the other night. He states that he was exercising yesterday on the elliptical when his \"heart kept beating quickly. \" He denies associated chest pain or pressure. No nausea or vomiting. No lightheadedness or syncope. He states that this happened to him previously while exercising 7 years ago. He states that he feels fine now. He does not use his cpap. Past Medical History:   has a past medical history of Atrial fibrillation ([address] Hypertension, Murmur, and Other activity(E029.9). Surgical History:   has a past surgical history that includes Aortic valve replacement and Cholecystectomy. Social History:   reports that he has never smoked. He does not have any smokeless tobacco history on file. He reports current alcohol use. He reports that he does not use drugs. Family History:  Father and uncles all had CABG (smokers)    Home Medications:  Were reviewed and are listed in nursing record. and/or listed below  Prior to Admission medications    Medication Sig Start Date End Date Taking?  Authorizing Provider   warfarin (COUMADIN) 3 MG tablet Take 6 mg by mouth daily   Yes Historical Provider, MD   atorvastatin (LIPITOR) 10 MG tablet Take 10 mg by mouth daily   Yes Historical Provider, MD   IBUPROFEN Take 600 mg by mouth as needed. Historical Provider, MD        Current Medications:  Current Facility-Administered Medications   Medication Dose Route Frequency Provider Last Rate Last Admin    dilTIAZem 125 mg in dextrose 5 % 125 mL infusion  2.5-15 mg/hr IntraVENous Continuous Angelina Brown MD 15 mL/hr at 01/08/23 0509 15 mg/hr at 01/08/23 0509    atorvastatin (LIPITOR) tablet 10 mg  10 mg Oral Nightly Angelina Brown MD   10 mg at 01/07/23 1942    sodium chloride flush 0.9 % injection 5-40 mL  5-40 mL IntraVENous 2 times per day Angelina Brown MD   10 mL at 01/07/23 1942    sodium chloride flush 0.9 % injection 5-40 mL  5-40 mL IntraVENous PRN Abbe Shaw MD        0.9 % sodium chloride infusion   IntraVENous PRN Angelina Brown MD        polyethylene glycol (GLYCOLAX) packet 17 g  17 g Oral Daily PRN Angelina Brown MD        acetaminophen (TYLENOL) tablet 650 mg  650 mg Oral Q6H PRN Angelina Brown MD   650 mg at 01/07/23 2021    Or    acetaminophen (TYLENOL) suppository 650 mg  650 mg Rectal Q6H PRN Abbe Shaw MD        perflutren lipid microspheres (DEFINITY) injection 1.5 mL  1.5 mL IntraVENous ONCE PRN Angelina Brown MD        prochlorperazine (COMPAZINE) injection 10 mg  10 mg IntraVENous Q6H PRN Angelina Brown MD        warfarin placeholder: dosing by pharmacy   Other Briseyda Palomares MD            Allergies:  Patient has no known allergies. Review of Systems:     Constitutional: there has been no unanticipated weight loss. There's been no change in energy level, sleep pattern, or activity level. Eyes: No visual changes or diplopia. No scleral icterus. ENT: No Headaches, hearing loss or vertigo. No mouth sores or sore throat. Cardiovascular: Reviewed in HPI. + irregular rhythm   Respiratory: No cough or wheezing, no sputum production. No hematemesis.     Gastrointestinal: No abdominal pain, appetite loss, blood in stools. No change in bowel or bladder habits. Genitourinary: No dysuria, trouble voiding, or hematuria. Musculoskeletal:  No gait disturbance, weakness or joint complaints. Integumentary: No rash or pruritis. Neurological: No headache, diplopia, change in muscle strength, numbness or tingling. No change in gait, balance, coordination, mood, affect, memory, mentation, behavior. Psychiatric: No anxiety, no depression. Endocrine: No malaise, fatigue or temperature intolerance. No excessive thirst, fluid intake, or urination. No tremor. Hematologic/Lymphatic: No abnormal bruising or bleeding, blood clots or swollen lymph nodes. Allergic/Immunologic: No nasal congestion or hives.       Physical Examination:    Vitals:    01/08/23 0455   BP: 93/62   Pulse: 64   Resp: 16   Temp: 97.4 °F (36.3 °C)   SpO2: 93%    Weight: 194 lb (88 kg)         General Appearance:  Alert, cooperative, no distress, appears stated age   Head:  Normocephalic, without obvious abnormality, atraumatic   Eyes:  PERRL, conjunctiva/corneas clear       Nose: Nares normal, no drainage or sinus tenderness   Throat: Lips, mucosa, and tongue normal   Neck: Supple, symmetrical, trachea midline, no adenopathy, thyroid: not enlarged, symmetric, no tenderness/mass/nodules, +JVD 10 cm       Lungs:   Clear to auscultation bilaterally, respirations unlabored   Chest Wall:  Prior median sternotomy, click crisp   Heart:  Regular rate and rhythm, S1, S2 normal, no murmur, rub or gallop   Abdomen:   Soft, non-tender, bowel sounds active all four quadrants,  no masses, no organomegaly           Extremities: Extremities normal, atraumatic, no cyanosis or edema   Pulses: 2+ and symmetric   Skin: Skin color, texture, turgor normal, no rashes or lesions   Pysch: Normal mood and affect   Neurologic: Normal gross motor and sensory exam.         Labs  CBC:   Lab Results   Component Value Date/Time    WBC 7.5 01/07/2023 11:31 AM    RBC 5.55 01/07/2023 11:31 AM    HGB 16.3 01/07/2023 11:31 AM    HCT 48.5 01/07/2023 11:31 AM    MCV 87.3 01/07/2023 11:31 AM    RDW 14.7 01/07/2023 11:31 AM     01/07/2023 11:31 AM     CMP:    Lab Results   Component Value Date/Time     01/08/2023 04:28 AM    K 4.0 01/08/2023 04:28 AM     01/08/2023 04:28 AM    CO2 25 01/08/2023 04:28 AM    BUN 15 01/08/2023 04:28 AM    CREATININE 1.1 01/08/2023 04:28 AM    GFRAA >60 05/27/2021 11:43 AM    AGRATIO 1.4 01/08/2023 04:28 AM    LABGLOM >60 01/08/2023 04:28 AM    GLUCOSE 123 01/08/2023 04:28 AM    PROT 6.6 01/08/2023 04:28 AM    CALCIUM 9.2 01/08/2023 04:28 AM    BILITOT 0.6 01/08/2023 04:28 AM    ALKPHOS 45 01/08/2023 04:28 AM    AST 16 01/08/2023 04:28 AM    ALT 22 01/08/2023 04:28 AM     PT/INR:  No results found for: PTINR  Lab Results   Component Value Date    TROPONINI <0.01 01/07/2023       EKG:  I have reviewed EKG with the following interpretation:  Impression:  atrial fibrillation, nonspecific st-t wave changes    Echo: - Left ventricle: The cavity size is normal. There is mild     concentric hypertrophy. Systolic function was normal. The     calculated ejection fraction was in the range of 58% to 63%. Normal diastolic function. The stroke volume is 60ml. The stroke     index is 29ml/m^2. The global longitudinal strain was -18%. - Aortic valve: A On-X mechanical prosthetic valve was present. There was trivial regurgitation. The mean systolic gradient is     17KI Hg. The peak systolic gradient is 23IJ Hg. The valve area by     the velocity-time integral method is 1.1cm^2. The valve area     index by the velocity-time integral method is 0.51cm^2/m^2. The     valve area by the mean velocity method is 1.2cm^2. - Aorta: The ascending aorta internal dimension in the A-P     direction, maximal systolic dimension is 4.0UB.   - Inferior vena cava: The vessel was normal in size; the     respirophasic diameter changes were blunted (&lt; 50%).    Stress: none  Cath: none  Cardiac Cta 2016: IMPRESSION:   1. No evidence of significant coronary artery disease. Total calcium   score of 9.   2. Focal punctate calcific plaque at the origin of the second diagonal   branch from the LAD without stenosis. 3. Calcified tricuspid aortic valve consistent with aortic stenosis. 4. Fusiform ectasia of the ascending aorta measures up to 3.9 cm and is   stable since prior study. MRI/EP/Other: none    Old notes reviewed  Telemetry reviewd  Ekg personally reviewed  Chest xray personally reviewed  Echo, cath, and   Medications and labs reviewed  high complexity/medical decision making due to extensive data review, extensive history review, independent review of data  high risk due to acute illness, evaluation of drug-drug interactions, medication management and diagnostic interventions        Assessment  Patient Active Problem List   Diagnosis    Atrial fibrillation with rapid ventricular response ([facility] [address]         Plan:    I had the opportunity to review the clinical symptoms and presentation of Kal Bernal. Assessment/Plan:  Paroxsymal atrial fibrillation  - new problem  - on warfarin, recent subtherapeutic INR ( 1.5)  - htn, obesity, sleep apnea, history of valve disease  Plan:  - echo  - bnp and trop  - continue diltiazem. Give 20 mg iv furosemide and magnesium  - give iv magnesium and digoxin  - consult EP for cardioversion/ablation  - treadmill stress test following restoration of sinus rhythm   - continue warfarin     2. Bicuspid valve Aortic valve replacement with no -x mechanical prosthesis 2017  - new problem  Plan:  - echocardiogram  - discussed importance of having family members screened    3. Aortic root dilation  - new problem  Plan:  - echocardiogram  - d-dimer    4. Mild CAD  - continue statin     5. HTN    6. HLD    7.  ELADIA  - needs treatment     Wilberto Davies DO, 1/8/2023 8:14 AM

## 2023-01-08 NOTE — PROGRESS NOTES
Pharmacy Note  Warfarin Consult      Dx: AVR  Goal INR range 2.5-3.5  Home Warfarin dose: 6 mg daily     Date                 INR                  Warfarin   1/7                  1.88                     8 mg   1/8                  2.19                     6 mg     Recommend Warfarin 6 mg tonight x1. Daily INR ordered. Rx will continue to manage therapy per consult order.     [facility]

## 2023-01-08 NOTE — PROGRESS NOTES
Hospitalist Progress Note      PCP: Bob Varela    Date of Admission: 1/7/2023    Chief Complaint: Palpitations    Hospital Course: Presented with palpitations. Second episode of atrial fibrillation ever. Started during workout. Noted atrial fibrillation persists. Subjective: No chest pain, no shortness of breath, no nausea, no vomiting, no abdominal pain      Medications:  Reviewed    Infusion Medications    dilTIAZem 15 mg/hr (01/08/23 0509)    sodium chloride       Scheduled Medications    magnesium sulfate  1,000 mg IntraVENous Once    atorvastatin  10 mg Oral Nightly    sodium chloride flush  5-40 mL IntraVENous 2 times per day    warfarin placeholder: dosing by pharmacy   Other RX Placeholder     PRN Meds: sodium chloride flush, sodium chloride, polyethylene glycol, acetaminophen **OR** acetaminophen, perflutren lipid microspheres, prochlorperazine      Intake/Output Summary (Last 24 hours) at 1/8/2023 1309  Last data filed at 1/8/2023 1231  Gross per 24 hour   Intake 1077 ml   Output --   Net 1077 ml       Physical Exam Performed:    /68   Pulse (!) 102   Temp 98.7 °F (37.1 °C) (Oral)   Resp 20   Ht 5' 6\" (1.676 m)   Wt 194 lb (88 kg)   SpO2 94%   BMI 31.31 kg/m²     General appearance: No apparent distress, appears stated age and cooperative. HEENT: Pupils equal, round, and reactive to light. Conjunctivae/corneas clear. Neck: Supple, with full range of motion. No jugular venous distention. Trachea midline. Respiratory:  Normal respiratory effort. Clear to auscultation, bilaterally without Rales/Wheezes/Rhonchi. Cardiovascular: Irregularly irregular rhythm with normal S1/S2 without murmurs, rubs or gallops. Abdomen: Soft, non-tender, non-distended with normal bowel sounds. Musculoskeletal: No clubbing, cyanosis or edema bilaterally. Full range of motion without deformity. Skin: Skin color, texture, turgor normal.  No rashes or lesions.   Neurologic:  Neurovascularly intact without any focal sensory/motor deficits. Cranial nerves: II-XII intact, grossly non-focal.  Psychiatric: Alert and oriented, thought content appropriate, normal insight  Capillary Refill: Brisk, 3 seconds, normal   Peripheral Pulses: +2 palpable, equal bilaterally       Labs:   Recent Labs     01/07/23  1131   WBC 7.5   HGB 16.3   HCT 48.5        Recent Labs     01/07/23  1131 01/08/23  0428    139   K 4.6 4.0    106   CO2 25 25   BUN 15 15   CREATININE 1.0 1.1   CALCIUM 10.4 9.2     Recent Labs     01/07/23  1131 01/08/23  0428   AST 24 16   ALT 28 22   BILITOT 1.1* 0.6   ALKPHOS 52 45     Recent Labs     01/07/23  1131 01/08/23  0428   INR 1.88* 2.19*     Recent Labs     01/07/23  1131 01/07/23  1705 01/07/23  2044   TROPONINI <0.01 <0.01 <0.01       Urinalysis:    No results found for: [place], BACTERIA, RBCUA, BLOODU, SPECGRAV, GLUCOSEU    Radiology:  XR CHEST PORTABLE   Final Result   No acute process. PHARMACY TO DOSE WARFARIN  IP CONSULT TO CARDIOLOGY    Assessment/Plan:    Active Hospital Problems    Diagnosis     Atrial fibrillation with rapid ventricular response (HCC) [I48.91]      Priority: Medium     PLAN    Atrial fibrillation with rapid ventricular response  Heart rate still fast.  Atrial fibrillation persists. Cardiology consulted. Cardiac electrophysiology notified. Echo ordered  I also discussed with cardiology. Mechanical aortic valve in place  Subtherapeutic on warfarin. Continue warfarin. INR still low but higher compared to yesterday. Pharmacy monitoring. Dyslipidemia  Continue statin. No evidence of myalgia. Discussed with the patient. Questions asked    Discussed with nursing    DVT Prophylaxis: Therapeutic warfarin  Diet: ADULT DIET; Regular  Code Status: Full Code  PT/OT Eval Status: Not indicated. Active and independent.     Dispo -inpatient stay pending cardiology and cardiac electrophysiology recommendations    Appropriate for A1 Discharge Unit: Heather Godinez MD

## 2023-01-09 ENCOUNTER — TELEPHONE (OUTPATIENT)
Dept: CARDIOLOGY | Age: 53
End: 2023-01-09

## 2023-01-09 VITALS
TEMPERATURE: 98 F | HEIGHT: 66 IN | OXYGEN SATURATION: 96 % | SYSTOLIC BLOOD PRESSURE: 136 MMHG | WEIGHT: 193 LBS | RESPIRATION RATE: 16 BRPM | DIASTOLIC BLOOD PRESSURE: 93 MMHG | BODY MASS INDEX: 31.02 KG/M2 | HEART RATE: 59 BPM

## 2023-01-09 LAB
D DIMER: 0.34 UG/ML FEU (ref 0–0.6)
INR BLD: 2.59 (ref 0.87–1.14)
LV EF: 57 %
LVEF MODALITY: NORMAL
PROTHROMBIN TIME: 27.9 SEC (ref 11.7–14.5)
TSH REFLEX: 3.74 UIU/ML (ref 0.27–4.2)

## 2023-01-09 PROCEDURE — 6370000000 HC RX 637 (ALT 250 FOR IP): Performed by: INTERNAL MEDICINE

## 2023-01-09 PROCEDURE — 99223 1ST HOSP IP/OBS HIGH 75: CPT | Performed by: INTERNAL MEDICINE

## 2023-01-09 PROCEDURE — 93270 REMOTE 30 DAY ECG REV/REPORT: CPT | Performed by: INTERNAL MEDICINE

## 2023-01-09 PROCEDURE — 36415 COLL VENOUS BLD VENIPUNCTURE: CPT

## 2023-01-09 PROCEDURE — 85379 FIBRIN DEGRADATION QUANT: CPT

## 2023-01-09 PROCEDURE — 85610 PROTHROMBIN TIME: CPT

## 2023-01-09 RX ORDER — DIGOXIN 250 MCG
125 TABLET ORAL EVERY 12 HOURS PRN
Qty: 30 TABLET | Refills: 1 | Status: SHIPPED | OUTPATIENT
Start: 2023-01-09

## 2023-01-09 RX ORDER — METOPROLOL SUCCINATE 25 MG/1
25 TABLET, EXTENDED RELEASE ORAL 2 TIMES DAILY
Qty: 30 TABLET | Refills: 3 | Status: SHIPPED | OUTPATIENT
Start: 2023-01-09

## 2023-01-09 RX ORDER — METOPROLOL SUCCINATE 25 MG/1
25 TABLET, EXTENDED RELEASE ORAL 2 TIMES DAILY
Status: DISCONTINUED | OUTPATIENT
Start: 2023-01-09 | End: 2023-01-09 | Stop reason: HOSPADM

## 2023-01-09 RX ADMIN — METOPROLOL TARTRATE 25 MG: 25 TABLET, FILM COATED ORAL at 06:59

## 2023-01-09 NOTE — DISCHARGE INSTR - DIET
Good nutrition is important when healing from an illness, injury, or surgery. Follow any nutrition recommendations given to you during your hospital stay. If you were given an oral nutrition supplement while in the hospital, continue to take this supplement at home. You can take it with meals, in-between meals, and/or before bedtime. These supplements can be purchased at most local grocery stores, pharmacies, and chain [employer]-stores. If you have any questions about your diet or nutrition, call the hospital and ask for the dietitian.       Low fat low salt low cholesterol

## 2023-01-09 NOTE — TELEPHONE ENCOUNTER
Monitor company [employer]  Length of monitor 14 days  Monitor ordered by Katherine Thayer MD   Patch ID [id number]  Device number [id number]  [facility] Federica Ferrara RN notified of me leaving monitor with [facility]. Nurse to apply at the time of discharge.

## 2023-01-09 NOTE — PLAN OF CARE
Patient seen  Full note to come. Start PRN digoxin.   Follow up at David Robledo MD  Cardiac Electrophysiology  [address] [facility]  [phone] [facility]

## 2023-01-09 NOTE — CONSULTS
Electrophysiology Consultation   Date: 1/9/2023  Admit Date:  1/7/2023  Reason for Consultation: Paroxysmal atrial fibrilllation  Consult Requesting Physician: Mallorie Hidalgo MD     Chief Complaint   Patient presents with    Palpitations     Started while exercising 1 hour PTA. Hx of A-fib. HPI:   Mr. Philomena Kohler is a pleasant 45year old male with a medical history significant for very paroxysmal atrial fibrillation (?exercise induced?), hypertension, history of pulmonary embolism (idiopathic), aortic valve stenosis status post replacement (mechanical - 01/11/2017), and obstructive sleep apnea (oral appliance) who presents from home with symptomatic atrial fibrillation with RVR. According to patient he first was diagnosed with atrial fibrillation prior to his valve replacement surgery in 2017. Post operatively patient had some atrial fibrillation however hadn't had any recurrence since then. He reports that his atrial fibrillation is usually triggered by exercise and responds best to digoxin, which tends to cardiovert him. He noted that on Saturday, during exercise on elliptical he went into atrial fibrillation with rates up to 130. He tried to relax but he continued to be tachycardic, and could heart fast clicks from his valve and so he sought medical attention at [facility]. Patient denies EtOH use. He denies tobacco use. He denies illicit drug use. He is  with children. Patient denies fevers, chest pain, orthopnea, PND, lower extremity edema, abdominal swelling, shortness of breath, dyspnea on exertion, chills, visual changes, headaches, sore throat, cough, abdominal pain, nausea, vomiting, bleeding, bruising, dysuria, muscle/joint pain, confusion, depression, anxiety, skin lesions, etc.    Emergency Room/Hospital Course:  Patient was evaluated in the ER on 01/07/2022. His CMP was reassuring. His troponin enzymes were negative x 3. His CBC was reassuring.   His INR was subtherapeutic. His BNP was mildly elevated. Cardiology and then electrophysiology was consulted. Current rhythm: Sinus bradycardia. Known history of atrial fibrillation: yes - paroxysmal and exercise induced. Valvular disease: Yes  Associated symptoms: palpitations  Heart rate is  controlled  Previous cardioversion and/or ablation: no  History of CAD: No  History of sleep apnea: Yes  History of ETOH abuse/drug abuse: No  History of thyroid disease: No  Elevated BNP: Yes  Left atrial size is Normal    Past Medical History:   Diagnosis Date    Atrial fibrillation (HCC)     Hypertension     Murmur     Other activity(E029.9)     nose bleeds for last couple years, increase frequency over last few months random         Past Surgical History:   Procedure Laterality Date    AORTIC VALVE REPLACEMENT      CHOLECYSTECTOMY         No Known Allergies    Social History:  Reviewed. reports that he has never smoked. He does not have any smokeless tobacco history on file. He reports current alcohol use. He reports that he does not use drugs. Family History:  Reviewed. family history is not on file. No premature CAD. Review of System:  All other systems reviewed except for that noted above. Pertinent negatives and positives are:     General: negative for fever, chills   Ophthalmic ROS: negative for - eye pain or loss of vision  ENT ROS: negative for - headaches, sore throat   Respiratory: negative for - cough, sputum  Cardiovascular: Reviewed in HPI  Gastrointestinal: negative for - abdominal pain, diarrhea, N/V  Hematology: negative for - bleeding, blood clots, bruising or jaundice  Genito-Urinary:  negative for - Dysuria or incontinence  Musculoskeletal: negative for - Joint swelling, muscle pain  Neurological: negative for - confusion, dizziness, headaches   Psychiatric: No anxiety, no depression.   Dermatological: negative for - rash    Physical Examination:  Vitals:    01/09/23 0853   BP: (!) 136/93   Pulse: 59 Resp: 16   Temp: 98 °F (36.7 °C)   SpO2: 96%        Intake/Output Summary (Last 24 hours) at 2023 1442  Last data filed at 2023 1348  Gross per 24 hour   Intake 577 ml   Output 1050 ml   Net -473 ml     In: 1184.2 [P.O.:817; I.V.:367.2]  Out: 1500    Wt Readings from Last 3 Encounters:   23 193 lb (87.5 kg)   21 220 lb 10.9 oz (100.1 kg)   18 190 lb (86.2 kg)     Temp  Av.9 °F (36.6 °C)  Min: 97.8 °F (36.6 °C)  Max: 98 °F (36.7 °C)  Pulse  Av.7  Min: 59  Max: 69  BP  Min: 111/73  Max: 136/93  SpO2  Av.3 %  Min: 92 %  Max: 96 %    Telemetry: Sinus bradycardia. Paroxysmal atrial fibrillation. Constitutional: Alert. Oriented to person, place, and time. No distress. Head: Normocephalic and atraumatic. Mouth/Throat: Lips appear moist. Oropharynx is clear and moist.  Eyes: Conjunctivae normal. EOM are normal.   Neck: Neck supple. No lymphadenopathy. No rigidity. No JVD present. Cardiovascular: Normal rate, regular rhythm. Normal S1&S2. Pulmonary/Chest: Bilateral respiratory sounds present. No respiratory accessory muscle use. Abdominal: Soft. Normal bowel sounds present. No distension, No tenderness. No splenomegaly. No hernia. Musculoskeletal: No tenderness. No edema    Neurological: Alert and oriented. Cranial nerve II-XII grossly intact. Skin: Skin is warm and dry. No rash, lesions, ulcerations noted. Psychiatric: No anxiety nor agitation. Labs:  Reviewed.    Recent Labs     23  1131 23  0428    139   K 4.6 4.0    106   CO2 25 25   BUN 15 15   CREATININE 1.0 1.1     Recent Labs     23  1131   WBC 7.5   HGB 16.3   HCT 48.5   MCV 87.3        Lab Results   Component Value Date/Time    TROPONINI <0.01 2023 08:44 PM     No results found for: BNP  Lab Results   Component Value Date/Time    PROTIME 27.9 2023 04:20 AM    PROTIME 24.4 2023 04:28 AM    PROTIME 21.6 2023 11:31 AM    INR 2.59 2023 04:20 AM    INR 2.19 01/08/2023 04:28 AM    INR 1.88 01/07/2023 11:31 AM     No results found for: CHOL, HDL, TRIG    Diagnostic and imaging results reviewed. ECG: Atrial fibrillation with RVR. Non-specific TW changes. Echo: 01/09/2023   Summary   Left ventricular systolic function is normal with a 3D calculated EF of 57%. The left ventricle is normal in size with mild concentric hypertrophy. No obvious regional wall motion abnormalities noted. Normal left ventricular diastolic function. The right ventricle is mildly dilated. Right ventricular systolic function is low normal.   The right atrium is severely enlarged. The ascending aorta is dilated at 4.2 cm. A 23 mm ON-X mechanical aortic valve appears well seated with normal Doppler   values. Inadequate tricuspid valve regurgitation to estimate systolic pulmonary   artery pressure. Cath: 12/23/2016  IMPRESSION:   1. No evidence of significant coronary artery disease. Total calcium   score of 9.   2. Focal punctate calcific plaque at the origin of the second diagonal   branch from the LAD without stenosis. 3. Calcified tricuspid aortic valve consistent with aortic stenosis. 4. Fusiform ectasia of the ascending aorta measures up to 3.9 cm and is   stable since prior study. I independently reviewed the ECG and telemetry.     Scheduled Meds:   warfarin  4.5 mg Oral Once    metoprolol succinate  25 mg Oral BID    atorvastatin  10 mg Oral Nightly    sodium chloride flush  5-40 mL IntraVENous 2 times per day    warfarin placeholder: dosing by pharmacy   Other RX Placeholder     Continuous Infusions:   sodium chloride       PRN Meds:.sodium chloride flush, sodium chloride, polyethylene glycol, acetaminophen **OR** acetaminophen, perflutren lipid microspheres, prochlorperazine     Assessment:   Patient Active Problem List    Diagnosis Date Noted    Atrial fibrillation with rapid ventricular response ([address] 01/07/2023      Active Hospital Problems Diagnosis Date Noted    Atrial fibrillation with rapid ventricular response [facility]) [I48.91] 01/07/2023     Priority: Medium     Mr. Mariam Cannon is a pleasant 45year old male with a medical history significant for very paroxysmal atrial fibrillation (?exercise induced?), hypertension, history of pulmonary embolism (idiopathic), aortic valve stenosis status post replacement (mechanical - 01/11/2017), and obstructive sleep apnea (oral appliance) who presents from home with symptomatic atrial fibrillation with RVR. Problem List:  1. Paroxysmal atrial fibrillation. 2. Aortic valve stenosis status post mechanical valve replacement (2017). 3. Obstructive sleep apnea. Assessment and Plan:  1. Paroxysmal atrial fibrillation. Patient is a pleasant 47-year-old male with a medical history is a man for paroxysmal atrial fibrillation, aortic valve stenosis secondary to bicuspid aortic valve status post replacement (2017), history of unprovoked pulmonary embolism, and hypertension who presents from home with symptomatic atrial fibrillation with palpitations. Of note, patient reports that he converts to normal sinus rhythm on digoxin which is interesting as digoxin typically triggered his atrial fibrillation. Should this be true digoxin would be a huge help given patient's tachybradycardia events since he has been here on metoprolol. Afib risk factors including age, HTN, obesity, inactivity and ELADIA were discussed with patient. Risk factor modification recommended. Patient's [id number] score is 3 with a stroke risk of 3.0%. We discussed oral anticoagulation to decrease the risk of thromboembolic events including stroke. Benefits and alternatives were discussed with patient. Risk of bleeding was discussed. Patient verbalized understanding. He is already on warfarin for his aortic valve.     Rate control strategy options including cardioversion, atrial fibrillation ablation, pacemaker with AVN ablation, anti-arrhythmic strategy, and rate control strategy were discussed with patient. Risks, benefits and alternative of each treatment options were explained. All questions were answered. Patient has opted for rate control with PRN digoxin (converts patient, he will also discuss flecainide or class III agents with his cardiac team at [facility]). We also discussed tachy-dorina syndrome given borderline bradycardia on telemetry while on metoprolol. I'm hopeful to avoid this if possible. - Increase compliance with oral appliance. - Transition to metoprolol succinate.  - Continue warfarin with goal INR above 2.5 (2.5-3.5); On-X valve with history of PE and atrial fibrillation.  - PRN digoxin for paroxysms of atrial fibrillation.  - Continue weight loss. - Follow up with [facility] cardiology team.  - [employer] for discharge with 2 week cardiac monitor. 2. Aortic valve stenosis status post mechanical valve replacement (2017). Stable. Valve in a good position with low gradients. - Plan as per above. 3. Obstructive sleep apnea. - Plan as per above. Thank you for allowing me to participate in the care of Rm Norwood . If you have any questions/comments, please do not hesitate to contact us.     Mehran Chan MD  Cardiac Electrophysiology  [address] [facility]  [phone] [facility]

## 2023-01-09 NOTE — PROGRESS NOTES
Pharmacy Note  Warfarin Consult  Dx: AVR  Goal INR range 2.5-3.5  Home Warfarin dose: 6 mg daily     Date                 INR                  Warfarin   1/7                  1.88                     8 mg   1/8                  2.19                     6 mg  1/9                  2.59                     4.5 mg     Recommend Warfarin 4.5 mg tonight x1 due to jump in INR of 0.4. Daily INR ordered. Rx will continue to manage therapy per consult order.     Bethel Cabot, PharmD 1/9/2023  12:58 PM

## 2023-01-09 NOTE — DISCHARGE SUMMARY
Hospital Medicine Discharge Summary    Patient ID: Rm Norwood      Patient's PCP: Demetrius Nunes    Admit Date: 1/7/2023     Discharge Date:   01/09/23     Admitting Provider: No admitting provider for patient encounter. Discharge Provider: Maria De Jesus Ann MD     Discharge Diagnoses: Active Hospital Problems    Diagnosis     Atrial fibrillation with rapid ventricular response ([facility] [address] [I48.91]      Priority: Medium       The patient was seen and examined on day of discharge and this discharge summary is in conjunction with any daily progress note from day of discharge. Hospital Course:  45 Y M with  h/o Afib presented with palpitations during a workout, found to have RVR. Symptomatic RVR. Weaned off dilt gtt. Restarted metoprolol PO. Cardiology has ordered a nuclear stress test, pending at the time of this note. Mechanical aortic valve appears to be functioning well. TTE is pending at the time of this note. HLD. Statin. Physical Exam Performed:     BP (!) 136/93   Pulse 59   Temp 98 °F (36.7 °C) (Oral)   Resp 16   Ht 5' 6\" (1.676 m)   Wt 193 lb (87.5 kg)   SpO2 96%   BMI 31.15 kg/m²       General appearance:  No apparent distress, appears stated age and cooperative. HEENT:  Normal cephalic, atraumatic without obvious deformity. Pupils equal, round, and reactive to light. Extra ocular muscles intact. Conjunctivae/corneas clear. Neck: Supple, with full range of motion. No jugular venous distention. Trachea midline. Respiratory:  Normal respiratory effort. Clear to auscultation, bilaterally without Rales/Wheezes/Rhonchi. Cardiovascular:  Regular rate and rhythm with normal S1/S2 without murmurs, rubs or gallops. Abdomen: Soft, non-tender, non-distended with normal bowel sounds. Musculoskeletal:  No clubbing, cyanosis or edema bilaterally. Full range of motion without deformity.   Skin: Skin color, texture, turgor normal.  No rashes or lesions. Neurologic:  Neurovascularly intact without any focal sensory/motor deficits. Cranial nerves: II-XII intact, grossly non-focal.  Psychiatric:  Alert and oriented, thought content appropriate, normal insight  Capillary Refill: Brisk,< 3 seconds   Peripheral Pulses: +2 palpable, equal bilaterally       Labs: For convenience and continuity at follow-up the following most recent labs are provided:      CBC:    Lab Results   Component Value Date/Time    WBC 7.5 01/07/2023 11:31 AM    HGB 16.3 01/07/2023 11:31 AM    HCT 48.5 01/07/2023 11:31 AM     01/07/2023 11:31 AM       Renal:    Lab Results   Component Value Date/Time     01/08/2023 04:28 AM    K 4.0 01/08/2023 04:28 AM     01/08/2023 04:28 AM    CO2 25 01/08/2023 04:28 AM    BUN 15 01/08/2023 04:28 AM    CREATININE 1.1 01/08/2023 04:28 AM    CALCIUM 9.2 01/08/2023 04:28 AM         Significant Diagnostic Studies    Radiology:   XR CHEST PORTABLE   Final Result   No acute process. Consults:     PHARMACY TO DOSE WARFARIN  IP CONSULT TO CARDIOLOGY  IP CONSULT TO CARDIOLOGY    Disposition:  home     Condition at Discharge: Stable    Discharge Instructions/Follow-up:  Follow up with PCP within 1-2 weeks. Follow up with cardiology per their recommendations.      Code Status:  Full Code     Activity: activity as tolerated    Diet: cardiac diet      Discharge Medications:     Current Discharge Medication List             Details   digoxin (LANOXIN) 250 MCG tablet Take 0.5 tablets by mouth every 12 hours as needed (afib)  Qty: 30 tablet, Refills: 1      metoprolol succinate (TOPROL XL) 25 MG extended release tablet Take 1 tablet by mouth in the morning and at bedtime  Qty: 30 tablet, Refills: 3                Details   warfarin (COUMADIN) 3 MG tablet Take 6 mg by mouth daily      atorvastatin (LIPITOR) 10 MG tablet Take 10 mg by mouth daily               Time Spent on discharge: 33 mins in the examination, evaluation, counseling and review of medications and discharge plan. Signed:    Manjit Kathleen MD   1/9/2023      Thank you Denny Barker for the opportunity to be involved in this patient's care. If you have any questions or concerns, please feel free to contact me at [phone].

## 2023-01-09 NOTE — PROGRESS NOTES
Patient discharged home with 2 week event monitor in place. IV's removed, tele box returned and cmu aware. Patient verbalized understanding of dc and follow up instructions. Meds to bed. Escorted to lobby by volunteer.

## 2023-01-10 NOTE — PROGRESS NOTES
Physician Progress Note      PATIENT:               [facility] #:                  [id number]  :                       1970  ADMIT DATE:       2023 10:59 AM  DISCH DATE:        2023 4:00 PM  RESPONDING  PROVIDER #:        Amelie Conner MD          QUERY TEXT:    Patient admitted with palpitations, noted to have atrial fibrillation and is   maintained on Coumadin for Afib and his mechanical valve. If possible, please   document in progress notes and discharge summary if you are evaluating and/or   treating any of the following: The medical record reflects the following:  Risk Factors: valve replacement, Afib, HTN  Clinical Indicators: per cardiology consult-\"[id number] score is 3\"  Treatment: Warfarin, cardiology consult, labs, supportive care    Thank you,  Shahram Landis RN, BSN  [email or web address]  Options provided:  -- Secondary hypercoagulable state in a patient with atrial fibrillation  -- Other - I will add my own diagnosis  -- Disagree - Not applicable / Not valid  -- Disagree - Clinically unable to determine / Unknown  -- Refer to Clinical Documentation Reviewer    PROVIDER RESPONSE TEXT:    Provider disagreed with this query.   not valid    Query created by: Milly Cordova on 2023 3:45 PM      Electronically signed by:  Amelie Conner MD 1/10/2023 1:28 PM

## 2023-01-25 PROCEDURE — 93272 ECG/REVIEW INTERPRET ONLY: CPT | Performed by: INTERNAL MEDICINE

## 2023-01-27 ENCOUNTER — TELEPHONE (OUTPATIENT)
Dept: CARDIOLOGY CLINIC | Age: 53
End: 2023-01-27

## 2023-01-27 NOTE — TELEPHONE ENCOUNTER
Spoke with patient and relayed monitor results. He V/U.  Will send copy of monitor to Dr Na Parikh at [facility] per patient's request.

## 2023-01-30 DIAGNOSIS — I48.91 ATRIAL FIBRILLATION WITH RVR (HCC): ICD-10-CM

## 2023-09-27 ENCOUNTER — HOSPITAL ENCOUNTER (EMERGENCY)
Age: 53
Discharge: HOME | End: 2023-09-27
Attending: EMERGENCY MEDICINE
Payer: COMMERCIAL

## 2023-09-27 VITALS
HEART RATE: 85 BPM | WEIGHT: 207.6 LBS | HEIGHT: 66 IN | OXYGEN SATURATION: 96 % | SYSTOLIC BLOOD PRESSURE: 155 MMHG | TEMPERATURE: 98.8 F | DIASTOLIC BLOOD PRESSURE: 82 MMHG | RESPIRATION RATE: 18 BRPM | BODY MASS INDEX: 33.37 KG/M2

## 2023-09-27 DIAGNOSIS — R42 LIGHTHEADEDNESS: Primary | ICD-10-CM

## 2023-09-27 LAB
EKG ATRIAL RATE: 78 BPM
EKG DIAGNOSIS: NORMAL
EKG P AXIS: 13 DEGREES
EKG P-R INTERVAL: 138 MS
EKG Q-T INTERVAL: 368 MS
EKG QRS DURATION: 92 MS
EKG QTC CALCULATION (BAZETT): 419 MS
EKG R AXIS: 16 DEGREES
EKG T AXIS: 34 DEGREES
EKG VENTRICULAR RATE: 78 BPM

## 2023-09-27 PROCEDURE — 93005 ELECTROCARDIOGRAM TRACING: CPT | Performed by: EMERGENCY MEDICINE

## 2023-09-27 PROCEDURE — 99283 EMERGENCY DEPT VISIT LOW MDM: CPT

## 2023-09-27 ASSESSMENT — ENCOUNTER SYMPTOMS
SHORTNESS OF BREATH: 0
NAUSEA: 0
VOMITING: 0

## 2023-09-27 ASSESSMENT — PAIN - FUNCTIONAL ASSESSMENT: PAIN_FUNCTIONAL_ASSESSMENT: NONE - DENIES PAIN

## 2024-02-24 ENCOUNTER — OFFICE VISIT (OUTPATIENT)
Dept: URGENT CARE | Age: 54
End: 2024-02-24

## 2024-02-24 VITALS
SYSTOLIC BLOOD PRESSURE: 133 MMHG | TEMPERATURE: 98.3 F | HEART RATE: 65 BPM | BODY MASS INDEX: 35.99 KG/M2 | OXYGEN SATURATION: 96 % | RESPIRATION RATE: 16 BRPM | WEIGHT: 223 LBS | DIASTOLIC BLOOD PRESSURE: 90 MMHG

## 2024-02-24 DIAGNOSIS — L30.1 DYSHIDROTIC ECZEMA: Primary | ICD-10-CM

## 2024-02-24 DIAGNOSIS — R03.0 ELEVATED BP WITHOUT DIAGNOSIS OF HYPERTENSION: ICD-10-CM

## 2024-02-24 RX ORDER — PANTOPRAZOLE SODIUM 40 MG/1
TABLET, DELAYED RELEASE ORAL
COMMUNITY
Start: 2023-12-01

## 2024-02-24 RX ORDER — METHYLPREDNISOLONE 4 MG/1
TABLET ORAL
Qty: 1 KIT | Refills: 0 | Status: SHIPPED | OUTPATIENT
Start: 2024-02-24 | End: 2024-03-01

## 2024-02-24 RX ORDER — CHLORTHALIDONE 25 MG/1
25 TABLET ORAL DAILY
COMMUNITY
Start: 2023-03-31

## 2024-02-24 RX ORDER — CLOBETASOL PROPIONATE 0.5 MG/G
CREAM TOPICAL
Qty: 1 EACH | Refills: 1 | Status: SHIPPED | OUTPATIENT
Start: 2024-02-24

## 2024-02-24 RX ORDER — OMEPRAZOLE 20 MG/1
20 CAPSULE, DELAYED RELEASE ORAL DAILY
COMMUNITY

## 2024-02-24 RX ORDER — ESCITALOPRAM OXALATE 10 MG/1
10 TABLET ORAL DAILY
COMMUNITY
Start: 2023-10-18

## 2024-02-24 ASSESSMENT — ENCOUNTER SYMPTOMS
EYE DISCHARGE: 0
SORE THROAT: 0
DIARRHEA: 0
ABDOMINAL PAIN: 0
NAUSEA: 0
VOMITING: 0
EYE PAIN: 0
EYE REDNESS: 0
SINUS PRESSURE: 0
SHORTNESS OF BREATH: 0
COUGH: 0

## 2024-02-24 NOTE — PROGRESS NOTES
Kendrick Alcazar Jr (: 1970) is a 53 y.o. male, New patient, here for evaluation of the following chief complaint(s):  Rash (PT has rash between fingers of both hands. Pt states it itches and hurts and has had the rash before. )      ASSESSMENT/PLAN:    ICD-10-CM    1. Dyshidrotic eczema  L30.1 clobetasol (TEMOVATE) 0.05 % cream     methylPREDNISolone (MEDROL DOSEPACK) 4 MG tablet      2. Elevated BP without diagnosis of hypertension  R03.0           - Low concern for cellulitis, septic joint, compartment syndrome, neurovascular compromise or sepsis.   - Pt to drink lots of fluids  - Pt to take medication as prescribed  - Pt ok to take tylenol as needed  - Pt to call if any symptoms worsen or follow up with PCP  - Pt to go to ER if have shortness of breath or chest pain    Follow up with your PCP within 5 days or, if unable, you can return to the clinic if symptoms persist beyond 5 days or if symptoms worsen.  The patient tolerated their visit well. The patient and/or the family were informed of the results of any tests, a time was given to answer questions, a plan was proposed and they agreed with plan. Reviewed AVS with treatment instructions and answered questions - pt/family expresses understanding and agreement with the discussed treatment plan and AVS instructions.    SUBJECTIVE/OBJECTIVE:  HPI:   53 y.o. male presents for complaint of 2 days ago he noticed having a rash in between his fingers. Pt states he has been washing his hand more often at home and then noticed this rash come up. Pt states the rash is itchy and burns as well.     Denies symptoms of fevers, body aches, chills, sore throat, nasal congestion, cough, abdominal pain, vomiting or diarrhea.    Has used over the counter steroid cream that does not seem to be helping. Pt states he has gotten this same rash in the past and was diagnosed with eczema and given a topical steroid cream that seemed to help.      History provided by:

## 2025-06-11 ENCOUNTER — OFFICE VISIT (OUTPATIENT)
Dept: URGENT CARE | Age: 55
End: 2025-06-11

## 2025-06-11 VITALS
HEIGHT: 66 IN | SYSTOLIC BLOOD PRESSURE: 136 MMHG | WEIGHT: 208 LBS | DIASTOLIC BLOOD PRESSURE: 88 MMHG | HEART RATE: 79 BPM | OXYGEN SATURATION: 97 % | BODY MASS INDEX: 33.43 KG/M2 | TEMPERATURE: 98.5 F

## 2025-06-11 DIAGNOSIS — Z88.9 H/O SEASONAL ALLERGIES: ICD-10-CM

## 2025-06-11 DIAGNOSIS — R09.82 POSTNASAL DRIP: ICD-10-CM

## 2025-06-11 DIAGNOSIS — J34.89 RHINORRHEA: ICD-10-CM

## 2025-06-11 DIAGNOSIS — J02.9 SORE THROAT: ICD-10-CM

## 2025-06-11 DIAGNOSIS — J30.2 SEASONAL ALLERGIC RHINITIS, UNSPECIFIED TRIGGER: ICD-10-CM

## 2025-06-11 DIAGNOSIS — R05.1 ACUTE COUGH: ICD-10-CM

## 2025-06-11 DIAGNOSIS — B96.89 ACUTE BACTERIAL SINUSITIS: Primary | ICD-10-CM

## 2025-06-11 DIAGNOSIS — R09.81 NASAL CONGESTION: ICD-10-CM

## 2025-06-11 DIAGNOSIS — J01.90 ACUTE BACTERIAL SINUSITIS: Primary | ICD-10-CM

## 2025-06-11 PROBLEM — L80 VITILIGO: Status: ACTIVE | Noted: 2018-06-04

## 2025-06-11 PROBLEM — M54.2 NECK PAIN: Status: ACTIVE | Noted: 2017-06-26

## 2025-06-11 PROBLEM — J34.3 HYPERTROPHY OF NASAL TURBINATES: Status: ACTIVE | Noted: 2025-03-17

## 2025-06-11 PROBLEM — M47.816 LUMBAR SPONDYLOSIS: Status: ACTIVE | Noted: 2019-06-19

## 2025-06-11 PROBLEM — I25.10 CORONARY ARTERY DISEASE INVOLVING NATIVE CORONARY ARTERY OF NATIVE HEART WITHOUT ANGINA PECTORIS: Status: ACTIVE | Noted: 2019-08-23

## 2025-06-11 PROBLEM — K20.0 EOSINOPHILIC ESOPHAGITIS: Status: ACTIVE | Noted: 2023-12-13

## 2025-06-11 PROBLEM — Z95.2 S/P AVR (AORTIC VALVE REPLACEMENT): Status: ACTIVE | Noted: 2022-10-03

## 2025-06-11 PROBLEM — I35.0 AORTIC STENOSIS: Status: ACTIVE | Noted: 2017-01-02

## 2025-06-11 PROBLEM — J34.2 DEVIATED NASAL SEPTUM: Status: ACTIVE | Noted: 2025-03-17

## 2025-06-11 PROBLEM — J32.9 CHRONIC RECURRENT SINUSITIS: Status: ACTIVE | Noted: 2025-03-17

## 2025-06-11 PROBLEM — E78.5 DYSLIPIDEMIA: Status: ACTIVE | Noted: 2019-08-23

## 2025-06-11 PROBLEM — G44.86 CERVICOGENIC HEADACHE: Status: ACTIVE | Noted: 2017-06-26

## 2025-06-11 RX ORDER — DEXTROMETHORPHAN HBR AND PYRILAMINE MALEATE 30; 30 MG/1; MG/1
1 TABLET ORAL 4 TIMES DAILY PRN
Qty: 40 TABLET | Refills: 0 | Status: SHIPPED | OUTPATIENT
Start: 2025-06-11

## 2025-06-11 RX ORDER — ATORVASTATIN CALCIUM 80 MG/1
80 TABLET, FILM COATED ORAL DAILY
COMMUNITY
Start: 2025-06-09

## 2025-06-11 RX ORDER — CETIRIZINE HYDROCHLORIDE 10 MG/1
10 TABLET ORAL DAILY
COMMUNITY
Start: 2025-05-21

## 2025-06-11 RX ORDER — FLUTICASONE PROPIONATE 50 MCG
2 SPRAY, SUSPENSION (ML) NASAL DAILY
COMMUNITY
Start: 2025-02-14

## 2025-06-11 RX ORDER — AMLODIPINE BESYLATE 5 MG/1
5 TABLET ORAL DAILY
COMMUNITY
Start: 2025-06-09

## 2025-06-11 RX ORDER — AZITHROMYCIN 250 MG/1
TABLET, FILM COATED ORAL
Qty: 6 TABLET | Refills: 0 | Status: SHIPPED | OUTPATIENT
Start: 2025-06-11 | End: 2025-06-21

## 2025-06-11 RX ORDER — GUAIFENESIN 600 MG/1
600 TABLET, EXTENDED RELEASE ORAL 2 TIMES DAILY
Qty: 20 TABLET | Refills: 0 | Status: SHIPPED | OUTPATIENT
Start: 2025-06-11 | End: 2025-06-21

## 2025-06-11 RX ORDER — EZETIMIBE 10 MG/1
10 TABLET ORAL DAILY
COMMUNITY
Start: 2025-03-31

## 2025-06-11 RX ORDER — FAMOTIDINE 20 MG/1
20 TABLET, FILM COATED ORAL 2 TIMES DAILY PRN
COMMUNITY
Start: 2025-05-21

## 2025-06-11 ASSESSMENT — ENCOUNTER SYMPTOMS
COUGH: 1
VOMITING: 0
SINUS PAIN: 0
VOICE CHANGE: 0
SORE THROAT: 0
SHORTNESS OF BREATH: 0
ABDOMINAL PAIN: 0
WHEEZING: 0
DIARRHEA: 0
RHINORRHEA: 1
NAUSEA: 0
SINUS PRESSURE: 1

## 2025-06-11 ASSESSMENT — VISUAL ACUITY: OU: 1

## 2025-06-11 NOTE — PATIENT INSTRUCTIONS
Kendrick,    Thank you for trusting [facility] with your care. Your decision to come to us means a lot and we are honored to be part of your healthcare journey. We value your trust and hope your experience with us was positive and met your expectations.    We're always looking for ways to improve, and your feedback is incredibly important to us. You will receive a text or email soon asking you how your visit went. for If you could take a moment to share your thoughts, it would mean the world to us. Your input helps us better serve you and others in the community.     Thank you again for choosing us. We're grateful for the opportunity to care for you and your loved ones. We hope to see you again - though we always wish you health and wellness!    Warm regards,    [facility]    Joe Horan PA-C, Aretha, (Clinical Supervisor + Registration), and Ashia (Medical Assistant)      Azithromycin is prescribed for antibiotic treatment of the sinus infection  Take the antibiotics until completed, do not stop unless otherwise directed by a healthcare provider.  Recommend daily yogurt or other probiotics while on antibiotics.  [place] DMT prescribed for cough and congestion relief.  If the pharmacy does not have this medication in stock, you can use over-the-counter Coricidin for similar relief.  Guaifenesin (Mucinex) prescribed for expectorant therapy.    Recommend OTC treatment for symptoms:  ibuprofen (Advil, Motrin) and acetaminophen (Tylenol) for fevers and pain relief.  decongestants (specifically pseudoephedrine - found behind the pharmacy counter - does not need a prescription) <avoid if you have a history of high blood pressure or heart conditions>, along with antihistamines (Claritin, Zyrtec, Allegra, or Xyzal) and nasal steroid sprays (such as Flonase) to help with nasal congestion and runny nose.  guaifenesin (Mucinex) can help with thinning out mucus which can help with chest

## 2025-06-11 NOTE — PROGRESS NOTES
grammar, punctuation, and spelling, as well as words and phrases that may be inappropriate. If there are any questions or concerns please feel free to contact the dictating provider for clarification.